# Patient Record
Sex: MALE | Race: WHITE | Employment: STUDENT | URBAN - METROPOLITAN AREA
[De-identification: names, ages, dates, MRNs, and addresses within clinical notes are randomized per-mention and may not be internally consistent; named-entity substitution may affect disease eponyms.]

---

## 2020-01-02 ENCOUNTER — OFFICE VISIT (OUTPATIENT)
Dept: FAMILY MEDICINE CLINIC | Age: 30
End: 2020-01-02

## 2020-01-02 VITALS
SYSTOLIC BLOOD PRESSURE: 131 MMHG | DIASTOLIC BLOOD PRESSURE: 85 MMHG | TEMPERATURE: 98 F | RESPIRATION RATE: 16 BRPM | HEIGHT: 74 IN | OXYGEN SATURATION: 100 % | HEART RATE: 92 BPM | BODY MASS INDEX: 23.61 KG/M2 | WEIGHT: 184 LBS

## 2020-01-02 DIAGNOSIS — Z13.220 SCREENING, LIPID: ICD-10-CM

## 2020-01-02 DIAGNOSIS — Z12.83 SCREENING FOR MALIGNANT NEOPLASM OF SKIN: ICD-10-CM

## 2020-01-02 DIAGNOSIS — Z00.00 ROUTINE GENERAL MEDICAL EXAMINATION AT A HEALTH CARE FACILITY: Primary | ICD-10-CM

## 2020-01-02 DIAGNOSIS — Z23 ENCOUNTER FOR IMMUNIZATION: ICD-10-CM

## 2020-01-02 DIAGNOSIS — S39.011A STRAIN OF RECTUS ABDOMINIS MUSCLE, INITIAL ENCOUNTER: ICD-10-CM

## 2020-01-02 NOTE — PROGRESS NOTES
Assessment/Plan:     Diagnoses and all orders for this visit:    1. Routine general medical examination at a health care facility  -     CBC WITH AUTOMATED DIFF  -     METABOLIC PANEL, COMPREHENSIVE  Labs pending. He is otherwise up-to-date on routine care. 2. Encounter for immunization  -     INFLUENZA VIRUS VAC QUAD,SPLIT,PRESV FREE SYRINGE IM  -     WI IMMUNIZ ADMIN,1 SINGLE/COMB VAC/TOXOID    3. Screening, lipid  -     LIPID PANEL    4. Screening for malignant neoplasm of skin  -     REFERRAL TO DERMATOLOGY    5. Strain of rectus abdominis muscle, initial encounter  -     REFERRAL TO PHYSICAL THERAPY               Follow-up and Dispositions    · Return in about 1 year (around 1/2/2021) for Follow Up. Discussed expected course/resolution/complications of diagnosis in detail with patient.    Medication risks/benefits/costs/interactions/alternatives discussed with patient.    Pt was given after visit summary which includes diagnoses, current medications & vitals. Pt expressed understanding with the diagnosis and plan    HPI:   Jessie Bell is a 34 y.o. male who presents for annual exam and to establish care. Reports a several month history of abdominal pain since lifting. History of right inguinal hernia repair 10 years ago. This is his first evaluation. Symptoms are worse with activity and improve with rest.  Has not attempted otc treatment. He was previously active. He reports a healthy diet. He is a missionary. He plans to relocate to [facility]. Allergies   Allergen Reactions    Adhesive Tape-Silicones Hives      There is no problem list on file for this patient. Past Medical History:   Diagnosis Date    Back pain, chronic       Past Surgical History:   Procedure Laterality Date    HX HERNIA REPAIR  2012    HX OTHER SURGICAL      left forearm fracture      No LMP for male patient.    Family History   Problem Relation Age of Onset    Bipolar Disorder Mother     No Known Problems Father     Bipolar Disorder Sister     Bipolar Disorder Maternal Grandmother       Social History     Socioeconomic History    Marital status: UNKNOWN     Spouse name: Not on file    Number of children: Not on file    Years of education: Not on file    Highest education level: Not on file   Occupational History    Not on file   Social Needs    Financial resource strain: Not on file    Food insecurity:     Worry: Not on file     Inability: Not on file    Transportation needs:     Medical: Not on file     Non-medical: Not on file   Tobacco Use    Smoking status: Never Smoker    Smokeless tobacco: Never Used   Substance and Sexual Activity    Alcohol use: Yes     Comment: one weekly.  Drug use: Never    Sexual activity: Not on file   Lifestyle    Physical activity:     Days per week: Not on file     Minutes per session: Not on file    Stress: Not on file   Relationships    Social connections:     Talks on phone: Not on file     Gets together: Not on file     Attends Restorationist service: Not on file     Active member of club or organization: Not on file     Attends meetings of clubs or organizations: Not on file     Relationship status: Not on file    Intimate partner violence:     Fear of current or ex partner: Not on file     Emotionally abused: Not on file     Physically abused: Not on file     Forced sexual activity: Not on file   Other Topics Concern    Not on file   Social History Narrative    Not on file        ROS:     Review of Systems   Constitutional: Negative for fever, malaise/fatigue and weight loss. HENT: Negative for hearing loss. Eyes: Negative for blurred vision and pain. Respiratory: Negative for cough and shortness of breath. Cardiovascular: Negative for chest pain, palpitations and leg swelling. Gastrointestinal: Positive for abdominal pain. Negative for blood in stool, constipation, diarrhea and melena.    Genitourinary: Negative for dysuria and hematuria. Musculoskeletal: Negative for joint pain. Skin: Negative for rash. Neurological: Negative for headaches. Psychiatric/Behavioral: Negative for depression. The patient is not nervous/anxious and does not have insomnia. Physical Exam:     Visit Vitals  /85   Pulse 92   Temp 98 °F (36.7 °C) (Oral)   Resp 16   Ht 6' 2\" (1.88 m)   Wt 184 lb (83.5 kg)   SpO2 100%   BMI 23.62 kg/m²        Vital signs and nurse documentation reviewed. Physical Exam  Constitutional:       General: He is not in acute distress. HENT:      Right Ear: No drainage. No middle ear effusion. Tympanic membrane is not injected, erythematous or bulging. Left Ear: No drainage. No middle ear effusion. Tympanic membrane is not injected, erythematous or bulging. Nose: No mucosal edema or rhinorrhea. Mouth/Throat:      Dentition: Normal dentition. Pharynx: No oropharyngeal exudate or posterior oropharyngeal erythema. Tonsils: No tonsillar abscesses. Eyes:      Pupils: Pupils are equal, round, and reactive to light. Neck:      Thyroid: No thyroid mass or thyromegaly. Vascular: No carotid bruit or JVD. Trachea: No tracheal deviation. Cardiovascular:      Pulses:           Dorsalis pedis pulses are 2+ on the right side and 2+ on the left side. Posterior tibial pulses are 2+ on the right side and 2+ on the left side. Heart sounds: S1 normal and S2 normal. No murmur. No friction rub. No gallop. Pulmonary:      Breath sounds: Normal breath sounds. No wheezing. Abdominal:      General: Bowel sounds are normal. There is no distension. Palpations: Abdomen is soft. There is no mass. Tenderness: There is no tenderness. Lymphadenopathy:      Cervical: No cervical adenopathy. Skin:     General: Skin is warm and dry. Neurological:      Cranial Nerves: No cranial nerve deficit. Sensory: Sensation is intact. Motor: Motor function is intact.       Gait: Gait is intact.    Psychiatric:         Mood and Affect: Mood and affect normal.         Cognition and Memory: Memory normal.         Judgment: Judgment normal.

## 2020-01-02 NOTE — PROGRESS NOTES
Chief Complaint   Patient presents with   [facility]    Hernia (Non Specific)    Skin Exam     1. Have you been to the ER, urgent care clinic since your last visit? Hospitalized since your last visit? No    2. Have you seen or consulted any other health care providers outside of the [address] [place] [address] since your last visit? Include any pap smears or colon screening.  No

## 2020-01-03 LAB
ALBUMIN SERPL-MCNC: 4.9 G/DL (ref 3.5–5.5)
ALBUMIN/GLOB SERPL: 2.6 {RATIO} (ref 1.2–2.2)
ALP SERPL-CCNC: 63 IU/L (ref 39–117)
ALT SERPL-CCNC: 37 IU/L (ref 0–44)
AST SERPL-CCNC: 22 IU/L (ref 0–40)
BASOPHILS # BLD AUTO: 0 X10E3/UL (ref 0–0.2)
BASOPHILS NFR BLD AUTO: 1 %
BILIRUB SERPL-MCNC: 1 MG/DL (ref 0–1.2)
BUN SERPL-MCNC: 12 MG/DL (ref 6–20)
BUN/CREAT SERPL: 15 (ref 9–20)
CALCIUM SERPL-MCNC: 9.6 MG/DL (ref 8.7–10.2)
CHLORIDE SERPL-SCNC: 105 MMOL/L (ref 96–106)
CHOLEST SERPL-MCNC: 158 MG/DL (ref 100–199)
CO2 SERPL-SCNC: 22 MMOL/L (ref 20–29)
CREAT SERPL-MCNC: 0.8 MG/DL (ref 0.76–1.27)
EOSINOPHIL # BLD AUTO: 0.1 X10E3/UL (ref 0–0.4)
EOSINOPHIL NFR BLD AUTO: 2 %
ERYTHROCYTE [DISTWIDTH] IN BLOOD BY AUTOMATED COUNT: 12.3 % (ref 12.3–15.4)
GLOBULIN SER CALC-MCNC: 1.9 G/DL (ref 1.5–4.5)
GLUCOSE SERPL-MCNC: 90 MG/DL (ref 65–99)
HCT VFR BLD AUTO: 39.8 % (ref 37.5–51)
HDLC SERPL-MCNC: 55 MG/DL
HGB BLD-MCNC: 13.7 G/DL (ref 13–17.7)
IMM GRANULOCYTES # BLD AUTO: 0.1 X10E3/UL (ref 0–0.1)
IMM GRANULOCYTES NFR BLD AUTO: 1 %
INTERPRETATION, 910389: NORMAL
LDLC SERPL CALC-MCNC: 89 MG/DL (ref 0–99)
LYMPHOCYTES # BLD AUTO: 1.5 X10E3/UL (ref 0.7–3.1)
LYMPHOCYTES NFR BLD AUTO: 26 %
MCH RBC QN AUTO: 30.9 PG (ref 26.6–33)
MCHC RBC AUTO-ENTMCNC: 34.4 G/DL (ref 31.5–35.7)
MCV RBC AUTO: 90 FL (ref 79–97)
MONOCYTES # BLD AUTO: 0.6 X10E3/UL (ref 0.1–0.9)
MONOCYTES NFR BLD AUTO: 11 %
NEUTROPHILS # BLD AUTO: 3.5 X10E3/UL (ref 1.4–7)
NEUTROPHILS NFR BLD AUTO: 59 %
PLATELET # BLD AUTO: 241 X10E3/UL (ref 150–450)
POTASSIUM SERPL-SCNC: 4.6 MMOL/L (ref 3.5–5.2)
PROT SERPL-MCNC: 6.8 G/DL (ref 6–8.5)
RBC # BLD AUTO: 4.44 X10E6/UL (ref 4.14–5.8)
SODIUM SERPL-SCNC: 143 MMOL/L (ref 134–144)
TRIGL SERPL-MCNC: 68 MG/DL (ref 0–149)
VLDLC SERPL CALC-MCNC: 14 MG/DL (ref 5–40)
WBC # BLD AUTO: 5.8 X10E3/UL (ref 3.4–10.8)

## 2020-03-18 ENCOUNTER — TELEPHONE (OUTPATIENT)
Dept: FAMILY MEDICINE CLINIC | Age: 30
End: 2020-03-18

## 2020-03-18 NOTE — TELEPHONE ENCOUNTER
Outbound call to patient. Patient states that he lives in the home with someone who may have possibly been exposed to the corona virus. Patient states that he is having a sore throat, cough, and very fatigued. Advised patient that no testing is currently available in the office, but if thinks he may have been exposed or symptoms get worse can go to the emergency room. Patient verbalized understanding.

## 2020-03-18 NOTE — TELEPHONE ENCOUNTER
Pt c/o slight cough sore throat, difficult thnking. Denies fever and exposure    BCB#[phone]      Pt wants to advice as to where he should go to be tested.

## 2020-04-21 ENCOUNTER — VIRTUAL VISIT (OUTPATIENT)
Dept: FAMILY MEDICINE CLINIC | Age: 30
End: 2020-04-21

## 2020-04-21 VITALS — BODY MASS INDEX: 22.84 KG/M2 | HEIGHT: 74 IN | WEIGHT: 178 LBS

## 2020-04-21 DIAGNOSIS — R21 RASH: Primary | ICD-10-CM

## 2020-04-21 RX ORDER — PREDNISONE 20 MG/1
TABLET ORAL
Qty: 18 TAB | Refills: 0 | Status: SHIPPED | OUTPATIENT
Start: 2020-04-21 | End: 2020-05-06 | Stop reason: SDUPTHER

## 2020-04-21 RX ORDER — DOXYCYCLINE 100 MG/1
100 CAPSULE ORAL 2 TIMES DAILY
COMMUNITY
End: 2020-06-03

## 2020-04-21 NOTE — PROGRESS NOTES
Consent: Hector Fu, who was seen by synchronous (real-time) audio-video technology, and/or his healthcare decision maker, is aware that this patient-initiated, Telehealth encounter on 4/21/2020 is a billable service, with coverage as determined by his insurance carrier. He is aware that he may receive a bill and has provided verbal consent to proceed: yes. Assessment & Plan:   Diagnoses and all orders for this visit:    1. Rash  -     predniSONE (DELTASONE) 20 mg tablet; Take 1 tab tid x 3 days, then 1 tab bid x 3 days, then 1 tab daily x 3 days         -   Hold doxycycline     712  Subjective: Hector Fu is a 31 y.o. male who was seen for Rash (rash on both legs for a week raised and red no drainage does itch and painful when walking  He has been working out side in his yard      Prior to Admission medications    Medication Sig Start Date End Date Taking? Authorizing Provider   doxycycline (MONODOX) 100 mg capsule Take 100 mg by mouth two (2) times a day. Yes Provider, Historical   predniSONE (DELTASONE) 20 mg tablet Take 1 tab tid x 3 days, then 1 tab bid x 3 days, then 1 tab daily x 3 days 4/21/20  Yes Pamela Ledezma NP     Allergies   Allergen Reactions    Adhesive Tape-Silicones Hives       There are no active problems to display for this patient. Current Outpatient Medications   Medication Sig Dispense Refill    doxycycline (MONODOX) 100 mg capsule Take 100 mg by mouth two (2) times a day.       predniSONE (DELTASONE) 20 mg tablet Take 1 tab tid x 3 days, then 1 tab bid x 3 days, then 1 tab daily x 3 days 18 Tab 0     Allergies   Allergen Reactions    Adhesive Tape-Silicones Hives     Past Medical History:   Diagnosis Date    Back pain, chronic      Past Surgical History:   Procedure Laterality Date    HX HERNIA REPAIR  2012    HX OTHER SURGICAL      left forearm fracture     Family History   Problem Relation Age of Onset    Bipolar Disorder Mother     No Known Problems Father  Bipolar Disorder Sister     Bipolar Disorder Maternal Grandmother      Social History     Tobacco Use    Smoking status: Never Smoker    Smokeless tobacco: Never Used   Substance Use Topics    Alcohol use: Yes     Comment: one weekly. Review of Systems   Constitutional: Negative. Respiratory: Negative. Cardiovascular: Negative. Skin: Positive for itching and rash.            Objective:   Vital Signs: (As obtained by patient/caregiver at home)  Visit Vitals  Ht 6' 2\" (1.88 m)   Wt 178 lb (80.7 kg)   BMI 22.85 kg/m²              Constitutional: [x] Appears well-developed and well-nourished [x] No apparent distress      [] Abnormal -     Mental status: [x] Alert and awake  [x] Oriented to person/place/time [x] Able to follow commands    [] Abnormal -     Eyes:   EOM    [x]  Normal    [] Abnormal -   Sclera  [x]  Normal    [] Abnormal -          Discharge [x]  None visible   [] Abnormal -     HENT: [x] Normocephalic, atraumatic  [] Abnormal -   [x] Mouth/Throat: Mucous membranes are moist    External Ears [x] Normal  [] Abnormal -    Neck: [x] No visualized mass [] Abnormal -     Pulmonary/Chest: [x] Respiratory effort normal   [x] No visualized signs of difficulty breathing or respiratory distress        [] Abnormal -      Musculoskeletal:   [x] Normal gait with no signs of ataxia         [x] Normal range of motion of neck        [] Abnormal -     Neurological:        [x] No Facial Asymmetry (Cranial nerve 7 motor function) (limited exam due to video visit)          [x] No gaze palsy        [] Abnormal -          Skin:        [] No significant exanthematous lesions or discoloration noted on facial skin         [x] Abnormal - erythematous confluent rash on both thighs and lower legs           Psychiatric:       [x] Normal Affect [] Abnormal -        [x] No Hallucinations    Other pertinent observable physical exam findings:-        We discussed the expected course, resolution and complications of the diagnosis(es) in detail. Medication risks, benefits, costs, interactions, and alternatives were discussed as indicated. I advised him to contact the office if his condition worsens, changes or fails to improve as anticipated. He expressed understanding with the diagnosis(es) and plan. Benjamín Rogers is a 31 y.o. male being evaluated by a video visit encounter for concerns as above. A caregiver was present when appropriate. Due to this being a TeleHealth encounter (During NADLG-44 public health emergency), evaluation of the following organ systems was limited: Vitals/Constitutional/EENT/Resp/CV/GI//MS/Neuro/Skin/Heme-Lymph-Imm. Pursuant to the emergency declaration under the [facility] waiver authority and the [employer] and [employer] Act, this Virtual  Visit was conducted, with patient's (and/or legal guardian's) consent, to reduce the patient's risk of exposure to COVID-19 and provide necessary medical care. Services were provided through a video synchronous discussion virtually to substitute for in-person clinic visit. Patient and provider were located at their individual homes.     Will call patient Kaur Joe [address], NP

## 2020-04-24 ENCOUNTER — VIRTUAL VISIT (OUTPATIENT)
Dept: FAMILY MEDICINE CLINIC | Age: 30
End: 2020-04-24

## 2020-04-24 DIAGNOSIS — R21 RASH: Primary | ICD-10-CM

## 2020-04-24 NOTE — PROGRESS NOTES
Burt Sanders  31 y.o. male  1990  [address]  [id number]     [address] [facility]       Encounter Date: 4/24/2020           Established Patient Visit Note: Burgess Naeem NP    Reason for Appointment:  Chief Complaint   Patient presents with    Rash     is improving since taking prednisone, stopped soxycycline given in other clinic       History of Present Illness:  History provided by patient reports that rash is improving since taking prednisone. Stopped doxycyline     Burt Sanders is a 31 y.o. male who presents today for:        Review of Systems  Review of Systems   Constitutional: Negative. Respiratory: Negative. Cardiovascular: Negative. Gastrointestinal: Negative. Skin: Positive for itching and rash. Allergies: Adhesive tape-silicones    Medications: (Updated to reflect final medication list after visit)    Current Outpatient Medications:     doxycycline (MONODOX) 100 mg capsule, Take 100 mg by mouth two (2) times a day., Disp: , Rfl:     predniSONE (DELTASONE) 20 mg tablet, Take 1 tab tid x 3 days, then 1 tab bid x 3 days, then 1 tab daily x 3 days, Disp: 18 Tab, Rfl: 0    History  Patient Care Team:  Catia Jaramillo NP as PCP - General (Nurse Practitioner)  Catia Jaramillo NP as PCP - [facility]    Past Medical History: he has a past medical history of Back pain, chronic. Past Surgical History: he has a past surgical history that includes hx hernia repair (2012) and hx other surgical.    Family Medical History: family history includes Bipolar Disorder in his maternal grandmother, mother, and sister; No Known Problems in his father. Social History: he reports that he has never smoked. He has never used smokeless tobacco. He reports current alcohol use. He reports that he does not use drugs. No specialty comments available.       Objective:   Vital Signs  Unable to obtain vital signs today as patient does not have equipment for this at home    Physical Exam  Constitutional:       Appearance: Normal appearance. HENT:      Mouth/Throat:      Mouth: Mucous membranes are moist.   Neck:      Musculoskeletal: Normal range of motion and neck supple. Skin:     Comments: Rash is improving    Neurological:      Mental Status: He is alert. Assessment & Plan:        I was in the office while conducting this encounter. Consent:  He and/or his healthcare decision maker is aware that this patient-initiated Telehealth encounter is a billable service, with coverage as determined by his insurance carrier. He is aware that he may receive a bill and has provided verbal consent to proceed: Yes    This virtual visit was conducted via [employer]. Pursuant to the emergency declaration under the [facility] waiver authority and the [employer] and [employer] Act, this Virtual  Visit was conducted to reduce the patient's risk of exposure to COVID-19 and provide continuity of care for an established patient. Services were provided through a video synchronous discussion virtually to substitute for in-person clinic visit. Due to this being a TeleHealth evaluation, many elements of the physical examination are unable to be assessed. Total Time: minutes: 5-10 minutes. I have discussed the diagnosis with the patient and the intended plan as seen in the above orders. The patient has received an after-visit summary along with patient information handout. I have discussed medication side effects and warnings with the patient as well.     Disposition        Trudy Gaines NP

## 2020-06-03 ENCOUNTER — VIRTUAL VISIT (OUTPATIENT)
Dept: FAMILY MEDICINE CLINIC | Age: 30
End: 2020-06-03

## 2020-06-03 DIAGNOSIS — L24.7 IRRITANT CONTACT DERMATITIS DUE TO PLANTS, EXCEPT FOOD: ICD-10-CM

## 2020-06-03 DIAGNOSIS — R21 BLISTERING RASH: Primary | ICD-10-CM

## 2020-06-03 RX ORDER — TRIAMCINOLONE ACETONIDE 5 MG/G
CREAM TOPICAL
Qty: 454 G | Refills: 0 | Status: SHIPPED | OUTPATIENT
Start: 2020-06-03 | End: 2020-11-11

## 2020-06-03 NOTE — PATIENT INSTRUCTIONS
Rash: Care Instructions Your Care Instructions A rash is any irritation or inflammation of the skin. Rashes have many possible causes, including allergy, infection, illness, heat, and emotional stress. Follow-up care is a key part of your treatment and safety. Be sure to make and go to all appointments, and call your doctor if you are having problems. It's also a good idea to know your test results and keep a list of the medicines you take. How can you care for yourself at home? · Wash the area with water only. Soap can make dryness and itching worse. Pat dry. · Put cold, wet cloths on the rash to reduce itching. · Keep cool, and stay out of the sun. · Leave the rash open to the air as much of the time as possible. · Sometimes petroleum jelly (Vaseline) can help relieve the discomfort caused by a rash. A moisturizing lotion, such as Cetaphil, also may help. Calamine lotion may help for rashes caused by contact with something (such as a plant or soap) that irritated the skin. Use it 3 or 4 times a day. · If your doctor prescribed a cream, use it as directed. If your doctor prescribed medicine, take it exactly as directed. · If your rash itches so badly that it interferes with your normal activities, take an over-the-counter antihistamine, such as diphenhydramine (Benadryl) or loratadine (Claritin). Read and follow all instructions on the label. When should you call for help? Call your doctor now or seek immediate medical care if: 
· You have signs of infection, such as: 
? Increased pain, swelling, warmth, or redness. ? Red streaks leading from the area. ? Pus draining from the area. ? A fever. · You have joint pain along with the rash. Watch closely for changes in your health, and be sure to contact your doctor if: 
· Your rash is changing or getting worse. For example, call if you have pain along with the rash, the rash is spreading, or you have new blisters. · You do not get better after 1 week. Where can you learn more? Go to http://sotero-amos.info/ Enter Z867 in the search box to learn more about \"Rash: Care Instructions. \" Current as of: October 31, 2019               Content Version: 12.5 © 2870-6062 [employer]. Care instructions adapted under license by [employer] (which disclaims liability or warranty for this information). If you have questions about a medical condition or this instruction, always ask your healthcare professional. [address] any warranty or liability for your use of this information.

## 2020-06-03 NOTE — PROGRESS NOTES
[id number] [address] Note      Subjective:     Chief Complaint   Patient presents with   Caio Weber is a 31y.o. year old male who presents for virtual evaluation of the following:    Rash: Onset: 1 week ago after pulling weeds  Character: blisters, itchy, stable for 2 days  Location: hands, left elbow, torso  Treatment: calamine and cortisone cream  Denies chest pain, shortness of breath, fever, vomiting, diarrhea, constipation  No sick contacts          Review of Systems   Pertinent positives and negative per HPI. All other systems  reviewed are negative for a Comprehensive ROS (10+). Past Medical History:   Diagnosis Date    Back pain, chronic         Social History     Socioeconomic History    Marital status: UNKNOWN     Spouse name: Not on file    Number of children: Not on file    Years of education: Not on file    Highest education level: Not on file   Occupational History    Not on file   Social Needs    Financial resource strain: Not on file    Food insecurity     Worry: Not on file     Inability: Not on file    Transportation needs     Medical: Not on file     Non-medical: Not on file   Tobacco Use    Smoking status: Never Smoker    Smokeless tobacco: Never Used   Substance and Sexual Activity    Alcohol use: Yes     Comment: one weekly.      Drug use: Never    Sexual activity: Never   Lifestyle    Physical activity     Days per week: Not on file     Minutes per session: Not on file    Stress: Not on file   Relationships    Social connections     Talks on phone: Not on file     Gets together: Not on file     Attends Hinduism service: Not on file     Active member of club or organization: Not on file     Attends meetings of clubs or organizations: Not on file     Relationship status: Not on file    Intimate partner violence     Fear of current or ex partner: Not on file     Emotionally abused: Not on file     Physically abused: Not on file     Forced sexual activity: Not on file   Other Topics Concern    Not on file   Social History Narrative    Not on file       Family History   Problem Relation Age of Onset    Bipolar Disorder Mother     No Known Problems Father     Bipolar Disorder Sister     Bipolar Disorder Maternal Grandmother        Current Outpatient Medications   Medication Sig    predniSONE (DELTASONE) 20 mg tablet Take 1 tab tid x 3 days, then 1 tab bid x 3 days, then 1 tab daily x 3 days    doxycycline (MONODOX) 100 mg capsule Take 100 mg by mouth two (2) times a day. No current facility-administered medications for this visit. Objective: There were no vitals filed for this visit. Physical Examination:  General: Alert, cooperative, no distress, appears stated age. Eyes: Conjunctivae clear. Pupils equally round. Extraocular muscles intact. Ears: Normal appearing external ear   Nose: Nares normal appearing  Mouth/Throat: Lips, mucosa, and tongue normal. Moist mucous membranes. No tonsillar enlargement noted. Neck: Supple, symmetrical, trachea midline, no neck mass visualized. Respiratory: Breathing comfortably, in no acute respiratory distress. Cardiovascular: Visualized extremities without edema. MSK: Upper extremities normal appearing. Skin: blisters of hands  Neurologic: No facial asymmetry. Normal gaze. Cranial nerves intact. Psychiatric: Affect appropriate. Mood euthymic. Thoughts logical. Speech volume and speed normal. No hallucinations. Well kempt. No visits with results within 3 Month(s) from this visit.    Latest known visit with results is:   Office Visit on 01/02/2020   Component Date Value Ref Range Status    WBC 01/02/2020 5.8  3.4 - 10.8 x10E3/uL Final    RBC 01/02/2020 4.44  4.14 - 5.80 x10E6/uL Final    HGB 01/02/2020 13.7  13.0 - 17.7 g/dL Final    HCT 01/02/2020 39.8  37.5 - 51.0 % Final    MCV 01/02/2020 90  79 - 97 fL Final    MCH 01/02/2020 30.9  26.6 - 33.0 pg Final    MCHC 01/02/2020 34.4  31.5 - 35.7 g/dL Final    RDW 01/02/2020 12.3  12.3 - 15.4 % Final    Comment: **Effective January 6, 2020, the RDW pediatric reference**    interval will be removed and the adult reference interval    will be changing to:                             Female 11.7 - 15.4                                                       Male 11.6 - 15.4      PLATELET 08/29/3356 195  150 - 450 x10E3/uL Final    NEUTROPHILS 01/02/2020 59  Not Estab. % Final    Lymphocytes 01/02/2020 26  Not Estab. % Final    MONOCYTES 01/02/2020 11  Not Estab. % Final    EOSINOPHILS 01/02/2020 2  Not Estab. % Final    BASOPHILS 01/02/2020 1  Not Estab. % Final    ABS. NEUTROPHILS 01/02/2020 3.5  1.4 - 7.0 x10E3/uL Final    Abs Lymphocytes 01/02/2020 1.5  0.7 - 3.1 x10E3/uL Final    ABS. MONOCYTES 01/02/2020 0.6  0.1 - 0.9 x10E3/uL Final    ABS. EOSINOPHILS 01/02/2020 0.1  0.0 - 0.4 x10E3/uL Final    ABS. BASOPHILS 01/02/2020 0.0  0.0 - 0.2 x10E3/uL Final    IMMATURE GRANULOCYTES 01/02/2020 1  Not Estab. % Final    ABS. IMM. GRANS. 01/02/2020 0.1  0.0 - 0.1 x10E3/uL Final    Glucose 01/02/2020 90  65 - 99 mg/dL Final    BUN 01/02/2020 12  6 - 20 mg/dL Final    Creatinine 01/02/2020 0.80  0.76 - 1.27 mg/dL Final    GFR est non-AA 01/02/2020 122  >59 mL/min/1.73 Final    GFR est AA 01/02/2020 142  >59 mL/min/1.73 Final    BUN/Creatinine ratio 01/02/2020 15  9 - 20 Final    Sodium 01/02/2020 143  134 - 144 mmol/L Final    Potassium 01/02/2020 4.6  3.5 - 5.2 mmol/L Final    Chloride 01/02/2020 105  96 - 106 mmol/L Final    CO2 01/02/2020 22  20 - 29 mmol/L Final    Calcium 01/02/2020 9.6  8.7 - 10.2 mg/dL Final    Protein, total 01/02/2020 6.8  6.0 - 8.5 g/dL Final    Albumin 01/02/2020 4.9  3.5 - 5.5 g/dL Final    GLOBULIN, TOTAL 01/02/2020 1.9  1.5 - 4.5 g/dL Final    A-G Ratio 01/02/2020 2.6* 1.2 - 2.2 Final    Bilirubin, total 01/02/2020 1.0  0.0 - 1.2 mg/dL Final    Alk. phosphatase 01/02/2020 63  39 - 117 IU/L Final    AST (SGOT) 01/02/2020 22  0 - 40 IU/L Final    ALT (SGPT) 01/02/2020 37  0 - 44 IU/L Final    Cholesterol, total 01/02/2020 158  100 - 199 mg/dL Final    Triglyceride 01/02/2020 68  0 - 149 mg/dL Final    HDL Cholesterol 01/02/2020 55  >39 mg/dL Final    VLDL, calculated 01/02/2020 14  5 - 40 mg/dL Final    LDL, calculated 01/02/2020 89  0 - 99 mg/dL Final    INTERPRETATION 01/02/2020 Note   Final    Supplemental report is available. Assessment/ Plan:   Diagnoses and all orders for this visit:    1. Blistering rash  -     triamcinolone (ARISTOCORT) 0.5 % topical cream; Apply  to affected area two (2) times daily as needed for Skin Irritation or Itching. use thin layer    2. Irritant contact dermatitis due to plants, except food  -     triamcinolone (ARISTOCORT) 0.5 % topical cream; Apply  to affected area two (2) times daily as needed for Skin Irritation or Itching. use thin layer        Topical steroid for contact dermatitis. We discussed the expected course, resolution and complications of the diagnosis(es) in detail. Medication risks, benefits, costs, interactions, and alternatives were discussed as indicated. I advised him to contact the office if his condition worsens, changes or fails to improve as anticipated. He expressed understanding with the diagnosis(es) and plan. Shayy Pineda is a 31 y.o. male being evaluated by a video visit encounter for concerns as above. A caregiver was present when appropriate. Due to this being a TeleHealth encounter ([facility]), evaluation of the following organ systems was limited: Vitals/Constitutional/EENT/Resp/CV/GI//MS/Neuro/Skin/Heme-Lymph-Imm.   Pursuant to the emergency declaration under the [address] [place] [address], [address] [place] [address] and the [employer] General Act, this Virtual  Visit was conducted, with patient's (and/or legal guardian's) consent, to reduce the patient's risk of exposure to COVID-19 and provide necessary medical care. Services were provided through a video synchronous discussion virtually to substitute for in-person clinic visit. Provider was in the office while conducting this encounter. Patient was at home during encounter. Other persons participating in call: None  Consent:  He and/or his healthcare decision maker is aware that this patient-initiated Telehealth encounter is a billable service, with coverage as determined by his insurance carrier. He is aware that he may receive a bill and has provided verbal consent to proceed: Yes  This virtual visit was conducted via [address] Pursuant to the emergency declaration under the [facility], [facility] waiver authority and the [employer] and [employer] Act, this Virtual  Visit was conducted to reduce the patient's risk of exposure to COVID-19 and provide continuity of care for an established patient. Services were provided through a video synchronous discussion virtually to substitute for in-person clinic visit. Due to this being a TeleHealth evaluation, many elements of the physical examination are unable to be assessed. Total Time: minutes: 11-20 minutes. Educated patient on red flag symptoms to warrant return to clinic or emergency room visit. I have discussed the diagnosis with the patient and the intended plan as seen in the above orders. The patient has been offered or received an after-visit summary and questions were answered concerning future plans. I have discussed medication side effects and warnings with the patient as well. Follow-up and Dispositions    · Return if symptoms worsen or fail to improve.          Signed,    Sera Smith MD  6/3/2020

## 2020-07-17 DIAGNOSIS — Z11.1 TUBERCULOSIS SCREENING: Primary | ICD-10-CM

## 2020-07-28 ENCOUNTER — VIRTUAL VISIT (OUTPATIENT)
Dept: FAMILY MEDICINE CLINIC | Age: 30
End: 2020-07-28

## 2020-07-28 DIAGNOSIS — Z11.1 TUBERCULOSIS SCREENING: Primary | ICD-10-CM

## 2020-07-28 NOTE — PROGRESS NOTES
[id number] [address] Note      Subjective:     Chief Complaint   Patient presents with    Follow-up     Pt requesting a tb test for work      Jolynn Shrestha is a 31y.o. year old male who presents for virtual evaluation of the following:     Tuberculosis:  TB test for work  No form to be signed  Denies known TB exposure, night sweats, fever, chest pain, shortness of breath, work with at risk population. Review of Systems   Pertinent positives and negative per HPI. All other systems  reviewed are negative for a Comprehensive ROS (10+). Past Medical History:   Diagnosis Date    Back pain, chronic         Social History     Socioeconomic History    Marital status: UNKNOWN     Spouse name: Not on file    Number of children: Not on file    Years of education: Not on file    Highest education level: Not on file   Occupational History    Not on file   Social Needs    Financial resource strain: Not on file    Food insecurity     Worry: Not on file     Inability: Not on file    Transportation needs     Medical: Not on file     Non-medical: Not on file   Tobacco Use    Smoking status: Never Smoker    Smokeless tobacco: Never Used   Substance and Sexual Activity    Alcohol use:  Yes     Alcohol/week: 5.0 standard drinks     Types: 5 Cans of beer per week    Drug use: Never    Sexual activity: Never   Lifestyle    Physical activity     Days per week: Not on file     Minutes per session: Not on file    Stress: Not on file   Relationships    Social connections     Talks on phone: Not on file     Gets together: Not on file     Attends [employer] service: Not on file     Active member of club or organization: Not on file     Attends meetings of clubs or organizations: Not on file     Relationship status: Not on file    Intimate partner violence     Fear of current or ex partner: Not on file     Emotionally abused: Not on file     Physically abused: Not on file     Forced sexual activity: Not on file   Other Topics Concern    Not on file   Social History Narrative    Not on file       Family History   Problem Relation Age of Onset    Bipolar Disorder Mother     No Known Problems Father     Bipolar Disorder Sister     Bipolar Disorder Maternal Grandmother        Current Outpatient Medications   Medication Sig    triamcinolone (ARISTOCORT) 0.5 % topical cream Apply  to affected area two (2) times daily as needed for Skin Irritation or Itching. use thin layer (Patient not taking: Reported on 7/28/2020)     No current facility-administered medications for this visit. Objective: There were no vitals filed for this visit. Physical Examination:  General: Alert, cooperative, no distress, appears stated age. Eyes: Conjunctivae clear. Pupils equally round. Extraocular muscles intact. Ears: Normal appearing external ear   Nose: Nares normal appearing  Mouth/Throat: Lips, mucosa, and tongue normal. Moist mucous membranes. No tonsillar enlargement noted. Neck: Supple, symmetrical, trachea midline, no neck mass visualized. Respiratory: Breathing comfortably, in no acute respiratory distress. Cardiovascular: Visualized extremities without edema. MSK: Upper extremities normal appearing. Skin: No significant erythematous lesions or discoloration noted on facial skin. No rashes or lesions on exposed skin. Neurologic: No facial asymmetry. Normal gaze. Cranial nerves intact. Psychiatric: Affect appropriate. Mood euthymic. Thoughts logical. Speech volume and speed normal. No hallucinations. Well kempt. No visits with results within 3 Month(s) from this visit.    Latest known visit with results is:   Office Visit on 01/02/2020   Component Date Value Ref Range Status    WBC 01/02/2020 5.8  3.4 - 10.8 x10E3/uL Final    RBC 01/02/2020 4.44  4.14 - 5.80 x10E6/uL Final    HGB 01/02/2020 13.7  13.0 - 17.7 g/dL Final    HCT 01/02/2020 39.8  37.5 - 51.0 % Final    MCV 01/02/2020 90  79 - 97 fL Final    MCH 01/02/2020 30.9  26.6 - 33.0 pg Final    MCHC 01/02/2020 34.4  31.5 - 35.7 g/dL Final    RDW 01/02/2020 12.3  12.3 - 15.4 % Final    Comment: **Effective January 6, 2020, the RDW pediatric reference**    interval will be removed and the adult reference interval    will be changing to:                             Female 11.7 - 15.4                                                       Male 11.6 - 15.4      PLATELET 35/45/5974 410  150 - 450 x10E3/uL Final    NEUTROPHILS 01/02/2020 59  Not Estab. % Final    Lymphocytes 01/02/2020 26  Not Estab. % Final    MONOCYTES 01/02/2020 11  Not Estab. % Final    EOSINOPHILS 01/02/2020 2  Not Estab. % Final    BASOPHILS 01/02/2020 1  Not Estab. % Final    ABS. NEUTROPHILS 01/02/2020 3.5  1.4 - 7.0 x10E3/uL Final    Abs Lymphocytes 01/02/2020 1.5  0.7 - 3.1 x10E3/uL Final    ABS. MONOCYTES 01/02/2020 0.6  0.1 - 0.9 x10E3/uL Final    ABS. EOSINOPHILS 01/02/2020 0.1  0.0 - 0.4 x10E3/uL Final    ABS. BASOPHILS 01/02/2020 0.0  0.0 - 0.2 x10E3/uL Final    IMMATURE GRANULOCYTES 01/02/2020 1  Not Estab. % Final    ABS. IMM.  GRANS. 01/02/2020 0.1  0.0 - 0.1 x10E3/uL Final    Glucose 01/02/2020 90  65 - 99 mg/dL Final    BUN 01/02/2020 12  6 - 20 mg/dL Final    Creatinine 01/02/2020 0.80  0.76 - 1.27 mg/dL Final    GFR est non-AA 01/02/2020 122  >59 mL/min/1.73 Final    GFR est AA 01/02/2020 142  >59 mL/min/1.73 Final    BUN/Creatinine ratio 01/02/2020 15  9 - 20 Final    Sodium 01/02/2020 143  134 - 144 mmol/L Final    Potassium 01/02/2020 4.6  3.5 - 5.2 mmol/L Final    Chloride 01/02/2020 105  96 - 106 mmol/L Final    CO2 01/02/2020 22  20 - 29 mmol/L Final    Calcium 01/02/2020 9.6  8.7 - 10.2 mg/dL Final    Protein, total 01/02/2020 6.8  6.0 - 8.5 g/dL Final    Albumin 01/02/2020 4.9  3.5 - 5.5 g/dL Final    GLOBULIN, TOTAL 01/02/2020 1.9  1.5 - 4.5 g/dL Final    A-G Ratio 01/02/2020 2.6* 1.2 - 2.2 Final    Bilirubin, total 01/02/2020 1.0  0.0 - 1.2 mg/dL Final    Alk. phosphatase 01/02/2020 63  39 - 117 IU/L Final    AST (SGOT) 01/02/2020 22  0 - 40 IU/L Final    ALT (SGPT) 01/02/2020 37  0 - 44 IU/L Final    Cholesterol, total 01/02/2020 158  100 - 199 mg/dL Final    Triglyceride 01/02/2020 68  0 - 149 mg/dL Final    HDL Cholesterol 01/02/2020 55  >39 mg/dL Final    VLDL, calculated 01/02/2020 14  5 - 40 mg/dL Final    LDL, calculated 01/02/2020 89  0 - 99 mg/dL Final    INTERPRETATION 01/02/2020 Note   Final    Supplemental report is available. Assessment/ Plan:   Diagnoses and all orders for this visit:    1. Tuberculosis screening  -     QUANTIFERON-TB GOLD PLUS      Requested tuberculosis screening ordered. Patient to schedule a lab visit. We discussed the expected course, resolution and complications of the diagnosis(es) in detail. Medication risks, benefits, costs, interactions, and alternatives were discussed as indicated. I advised him to contact the office if his condition worsens, changes or fails to improve as anticipated. He expressed understanding with the diagnosis(es) and plan. Modesta Maynard is a 31 y.o. male being evaluated by a video visit encounter for concerns as above. A caregiver was present when appropriate. Due to this being a TeleHealth encounter (During [facility]TG-69 public health emergency), evaluation of the following organ systems was limited: Vitals/Constitutional/EENT/Resp/CV/GI//MS/Neuro/Skin/Heme-Lymph-Imm. Pursuant to the emergency declaration under the [facility] waiver authority and the [employer] Act, this Virtual  Visit was conducted, with patient's (and/or legal guardian's) consent, to reduce the patient's risk of exposure to COVID-19 and provide necessary medical care.   Services were provided through a video synchronous discussion virtually to substitute for in-person clinic visit. Provider was in the office while conducting this encounter. Patient was at home during encounter. Other persons participating in call: None  Consent:  He and/or his healthcare decision maker is aware that this patient-initiated Telehealth encounter is a billable service, with coverage as determined by his insurance carrier. He is aware that he may receive a bill and has provided verbal consent to proceed: Yes  This virtual visit was conducted via [address] Pursuant to the emergency declaration under the [facility], [facility] waiver authority and the [employer] Act, this Virtual  Visit was conducted to reduce the patient's risk of exposure to COVID-19 and provide continuity of care for an established patient. Services were provided through a video synchronous discussion virtually to substitute for in-person clinic visit. Due to this being a TeleHealth evaluation, many elements of the physical examination are unable to be assessed. Total Time: minutes: 11-20 minutes. Educated patient on red flag symptoms to warrant return to clinic or emergency room visit. I have discussed the diagnosis with the patient and the intended plan as seen in the above orders. The patient has been offered or received an after-visit summary and questions were answered concerning future plans. I have discussed medication side effects and warnings with the patient as well. Follow-up and Dispositions    · Return if symptoms worsen or fail to improve.        Signed,    Caron Acevedo MD  7/28/2020

## 2020-07-28 NOTE — PROGRESS NOTES
Room: VV     Identified pt with two pt identifiers(name and ). Reviewed record in preparation for visit and have obtained necessary documentation. All patient medications has been reviewed. Chief Complaint   Patient presents with    Follow-up     Pt requesting a tb test for work        Health Maintenance Due   Topic    DTaP/Tdap/Td series (1 - Tdap)       There were no vitals filed for this visit. 1.Have you traveled outside of the [address] [place] [address] in the last month No    2. Have you been in close contact with someone who is suspected to have COVID-19 or has tested positive. No    3. Do you have any signs or symptoms? No    4. Have you been to the ER, urgent care clinic since your last visit? Hospitalized since your last visit? No    5. Have you seen or consulted any other health care providers outside of the [address] [place] [address] since your last visit? Include any pap smears or colon screening. No    Patient is accompanied by self I have received verbal consent from Brenda Ruvalcaba to discuss any/all medical information while they are present in the room.

## 2020-07-31 LAB
GAMMA INTERFERON BACKGROUND BLD IA-ACNC: 0.01 IU/ML
M TB IFN-G BLD-IMP: NEGATIVE
M TB IFN-G CD4+ BCKGRND COR BLD-ACNC: 0.01 IU/ML
MITOGEN IGNF BLD-ACNC: >10 IU/ML
QUANTIFERON INCUBATION, QF1T: NORMAL
QUANTIFERON TB2 AG: 0.01 IU/ML
SERVICE CMNT-IMP: NORMAL

## 2020-08-08 ENCOUNTER — HOSPITAL ENCOUNTER (EMERGENCY)
Age: 30
Discharge: HOME | End: 2020-08-08
Attending: STUDENT IN AN ORGANIZED HEALTH CARE EDUCATION/TRAINING PROGRAM
Payer: COMMERCIAL

## 2020-08-08 VITALS
BODY MASS INDEX: 23.65 KG/M2 | SYSTOLIC BLOOD PRESSURE: 126 MMHG | HEART RATE: 87 BPM | OXYGEN SATURATION: 95 % | HEIGHT: 74 IN | WEIGHT: 184.3 LBS | TEMPERATURE: 98.5 F | DIASTOLIC BLOOD PRESSURE: 65 MMHG | RESPIRATION RATE: 16 BRPM

## 2020-08-08 DIAGNOSIS — S80.212A KNEE ABRASION, LEFT, INITIAL ENCOUNTER: Primary | ICD-10-CM
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
## 2020-08-08 PROCEDURE — 99282 EMERGENCY DEPT VISIT SF MDM: CPT

## 2020-08-09 NOTE — ED NOTES
Patient given discharge papers and instructions. Patient verbalized understanding and stated not having any questions or concerns regarding his care. Patient ambulatory out of ED.

## 2020-08-09 NOTE — DISCHARGE INSTRUCTIONS
Patient Education        Scrapes (Abrasions): Care Instructions  Your Care Instructions  Scrapes (abrasions) are wounds where your skin has been rubbed or torn off. Most scrapes do not go deep into the skin, but some may remove several layers of skin. Scrapes usually don't bleed much, but they may ooze pinkish fluid. Scrapes on the head or face may appear worse than they are. They may bleed a lot because of the good blood supply to this area. Most scrapes heal well and may not need a bandage. They usually heal within 3 to 7 days. A large, deep scrape may take 1 to 2 weeks or longer to heal. A scab may form on some scrapes. Follow-up care is a key part of your treatment and safety. Be sure to make and go to all appointments, and call your doctor if you are having problems. It's also a good idea to know your test results and keep a list of the medicines you take. How can you care for yourself at home? · If your doctor told you how to care for your wound, follow your doctor's instructions. If you did not get instructions, follow this general advice:  ? Wash the scrape with clean water 2 times a day. Don't use hydrogen peroxide or alcohol, which can slow healing. ? You may cover the scrape with a thin layer of petroleum jelly, such as Vaseline, and a nonstick bandage. ? Apply more petroleum jelly and replace the bandage as needed. · Prop up the injured area on a pillow anytime you sit or lie down during the next 3 days. Try to keep it above the level of your heart. This will help reduce swelling. · Be safe with medicines. Take pain medicines exactly as directed. ? If the doctor gave you a prescription medicine for pain, take it as prescribed. ? If you are not taking a prescription pain medicine, ask your doctor if you can take an over-the-counter medicine. When should you call for help?    Call your doctor now or seek immediate medical care if:  · You have signs of infection, such as:  ? Increased pain, swelling, warmth, or redness around the scrape. ? Red streaks leading from the scrape. ? Pus draining from the scrape. ? A fever. · The scrape starts to bleed, and blood soaks through the bandage. Oozing small amounts of blood is normal.  Watch closely for changes in your health, and be sure to contact your doctor if the scrape is not getting better each day. Where can you learn more? Go to http://sotero-amos.info/  Enter A374 in the search box to learn more about \"Scrapes (Abrasions): Care Instructions. \"  Current as of: June 26, 2019               Content Version: 12.5  © 8239-2778 Healthwise, [employer]. Care instructions adapted under license by [employer] (which disclaims liability or warranty for this information). If you have questions about a medical condition or this instruction, always ask your healthcare professional. [address] any warranty or liability for your use of this information.
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
## 2020-11-09 ENCOUNTER — APPOINTMENT (OUTPATIENT)
Dept: FAMILY MEDICINE CLINIC | Age: 30
End: 2020-11-09

## 2020-11-11 ENCOUNTER — VIRTUAL VISIT (OUTPATIENT)
Dept: FAMILY MEDICINE CLINIC | Age: 30
End: 2020-11-11

## 2020-11-11 ENCOUNTER — OFFICE VISIT (OUTPATIENT)
Dept: URGENT CARE | Age: 30
End: 2020-11-11
Payer: COMMERCIAL

## 2020-11-11 VITALS — HEART RATE: 92 BPM | RESPIRATION RATE: 16 BRPM | OXYGEN SATURATION: 97 % | TEMPERATURE: 98.2 F

## 2020-11-11 DIAGNOSIS — B34.9 VIRAL ILLNESS: Primary | ICD-10-CM

## 2020-11-11 DIAGNOSIS — Z20.822 SUSPECTED COVID-19 VIRUS INFECTION: Primary | ICD-10-CM

## 2020-11-11 DIAGNOSIS — R07.0 THROAT PAIN: ICD-10-CM

## 2020-11-11 LAB
MONONUCLEOSIS SCREEN POC: NEGATIVE
S PYO AG THROAT QL: NEGATIVE
VALID INTERNAL CONTROL?: YES
VALID INTERNAL CONTROL?: YES

## 2020-11-11 PROCEDURE — 87880 STREP A ASSAY W/OPTIC: CPT | Performed by: FAMILY MEDICINE

## 2020-11-11 PROCEDURE — 99213 OFFICE O/P EST LOW 20 MIN: CPT | Performed by: NURSE PRACTITIONER

## 2020-11-11 PROCEDURE — 99203 OFFICE O/P NEW LOW 30 MIN: CPT | Performed by: FAMILY MEDICINE

## 2020-11-11 PROCEDURE — 86308 HETEROPHILE ANTIBODY SCREEN: CPT | Performed by: FAMILY MEDICINE

## 2020-11-11 NOTE — PROGRESS NOTES
Sore Throat    The history is provided by the patient. This is a new problem. The current episode started more than 1 week ago. There has been no fever. Associated symptoms include congestion, swollen glands and trouble swallowing. Pertinent negatives include no plugged ear sensation, no shortness of breath and no cough. He has had no exposure to strep or mono. He has tried nothing for the symptoms. Past Medical History:   Diagnosis Date    Back pain, chronic         Past Surgical History:   Procedure Laterality Date    HX HERNIA REPAIR  2012    HX OTHER SURGICAL      left forearm fracture         Family History   Problem Relation Age of Onset    Bipolar Disorder Mother     No Known Problems Father     Bipolar Disorder Sister     Bipolar Disorder Maternal Grandmother         Social History     Socioeconomic History    Marital status: SINGLE     Spouse name: Not on file    Number of children: Not on file    Years of education: Not on file    Highest education level: Not on file   Occupational History    Not on file   Social Needs    Financial resource strain: Not on file    Food insecurity     Worry: Not on file     Inability: Not on file    Transportation needs     Medical: Not on file     Non-medical: Not on file   Tobacco Use    Smoking status: Never Smoker    Smokeless tobacco: Never Used   Substance and Sexual Activity    Alcohol use:  Yes     Alcohol/week: 5.0 standard drinks     Types: 5 Cans of beer per week    Drug use: Never    Sexual activity: Never   Lifestyle    Physical activity     Days per week: Not on file     Minutes per session: Not on file    Stress: Not on file   Relationships    Social connections     Talks on phone: Not on file     Gets together: Not on file     Attends Spiritism service: Not on file     Active member of club or organization: Not on file     Attends meetings of clubs or organizations: Not on file     Relationship status: Not on file    Intimate partner violence     Fear of current or ex partner: Not on file     Emotionally abused: Not on file     Physically abused: Not on file     Forced sexual activity: Not on file   Other Topics Concern    Not on file   Social History Narrative    Not on file                ALLERGIES: Adhesive tape-silicones    Review of Systems   Constitutional: Positive for fatigue. Negative for fever. HENT: Positive for congestion, sore throat and trouble swallowing. Respiratory: Negative for cough and shortness of breath. All other systems reviewed and are negative. Vitals:    11/11/20 1458   Pulse: 92   Resp: 16   Temp: 98.2 °F (36.8 °C)   SpO2: 97%       Physical Exam  Vitals signs and nursing note reviewed. Constitutional:       General: He is not in acute distress. Appearance: He is not ill-appearing. HENT:      Nose: No congestion. Mouth/Throat:      Pharynx: No oropharyngeal exudate or posterior oropharyngeal erythema. Pulmonary:      Effort: Pulmonary effort is normal. No respiratory distress. Breath sounds: Normal breath sounds. Lymphadenopathy:      Cervical: No cervical adenopathy. MDM    Procedures        ICD-10-CM ICD-9-CM    1. Suspected COVID-19 virus infection  Z20.828 V01.79 NOVEL CORONAVIRUS (COVID-19)   2. Throat pain  R07.0 784.1 POC HETEROPHILE ANTIBODY SCREEN      AMB POC RAPID STREP A     No orders of the defined types were placed in this encounter. Results for orders placed or performed in visit on 11/11/20   POC HETEROPHILE ANTIBODY SCREEN   Result Value Ref Range    VALID INTERNAL CONTROL POC Yes     Mononucleosis screen (POC) Negative Negative   AMB POC RAPID STREP A   Result Value Ref Range    VALID INTERNAL CONTROL POC Yes     Group A Strep Ag Negative Negative     The patients condition was discussed with the patient and they understand. The patient is to follow up with primary care doctor. If signs and symptoms become worse the pt is to go to the ER.  The patient is to take medications as prescribed.

## 2020-11-11 NOTE — PROGRESS NOTES
[facility] Note  Subjective: Aleksey Dubon is a 31 y.o. male who presents for an acute visit with the following chief complaints. Chief Complaint   Patient presents with    Sore Throat     I was in the office while conducting this encounter. Consent:  He and/or his healthcare decision maker is aware that this patient-initiated Telehealth encounter is a billable service, with coverage as determined by his insurance carrier. He is aware that he may receive a bill and has provided verbal consent to proceed: Yes    This virtual visit was conducted via [employer]. Pursuant to the emergency declaration under the [facility], 1135 waiver authority and the [employer] and [employer] Act, this Virtual  Visit was conducted to reduce the patient's risk of exposure to COVID-19 and provide continuity of care for an established patient. Services were provided through a video synchronous discussion virtually to substitute for in-person clinic visit. Due to this being a TeleHealth evaluation, many elements of the physical examination are unable to be assessed. Total Time: minutes: 5-10 minutes. Here today with complaints of fatigue, sore throat and headache. Onset was 12 days ago, intermittent but persistent since onset. He reports his roommate started to exhibit symptoms of COVID in mid October, his roommate tested positive for COVID19 on 10/28/2020. The patient moved out of the apartment on this day. The patient then developed fatigue, sore throat and headache on 11/30/2020. He had negative rapid antigen testing on 11/31, then had PCR testing on 11/5 which was negative. Fatigue is described as generally feeling tired, was unable to work and was sent home due to fatigue 2 days ago. Sore throat is mild but persistent and headache is on and off also described as mild.   Mild nasal congestion but he does have a history of allergies and is unsure if this is related. Treatments: rest with relief. No previous evaluations. He has history of mono, worried about reactivation of mono and would like testing. Known COVID19 exposure 12 days ago - negative testing results as above. No known Strep exposure however, he works as . No recent travel. Current Outpatient Medications   Medication Sig Dispense Refill    triamcinolone (ARISTOCORT) 0.5 % topical cream Apply  to affected area two (2) times daily as needed for Skin Irritation or Itching. use thin layer (Patient not taking: Reported on 7/28/2020) 454 g 0     Allergies   Allergen Reactions    Adhesive Tape-Silicones Hives       ROS:   Complete review of systems was reviewed with pertinent information listed in HPI. Review of Systems   Constitutional: Positive for malaise/fatigue. Negative for chills, diaphoresis, fever and weight loss. HENT: Positive for congestion and sore throat. Negative for ear pain, hearing loss, sinus pain and tinnitus. Eyes: Negative for blurred vision and double vision. Respiratory: Negative for cough, hemoptysis, sputum production, shortness of breath and wheezing. Cardiovascular: Negative for chest pain, palpitations, orthopnea, claudication, leg swelling and PND. Gastrointestinal: Negative for abdominal pain, blood in stool, constipation, diarrhea, heartburn, melena, nausea and vomiting. Genitourinary: Negative for dysuria, frequency, hematuria and urgency. Musculoskeletal: Positive for myalgias. Negative for joint pain. Skin: Negative. Negative for itching and rash. Neurological: Positive for headaches. Negative for dizziness, tingling and weakness. Endo/Heme/Allergies: Positive for environmental allergies. Negative for polydipsia. Psychiatric/Behavioral: Negative. Objective: There were no vitals taken for this visit. Vitals and Nurse Documentation reviewed.      Physical Exam  Constitutional:       General: He is not in acute distress. Appearance: Normal appearance. He is well-developed, well-groomed and normal weight. He is not ill-appearing, toxic-appearing or diaphoretic. HENT:      Head: Normocephalic and atraumatic. Right Ear: Hearing normal.      Left Ear: Hearing normal.      Nose: No nasal deformity. Mouth/Throat:      Lips: Pink. No lesions. Mouth: Mucous membranes are moist.   Eyes:      General: Lids are normal.      Conjunctiva/sclera: Conjunctivae normal.   Pulmonary:      Effort: Pulmonary effort is normal.   Neurological:      Mental Status: He is alert and oriented to person, place, and time. Gait: Gait is intact. Psychiatric:         Attention and Perception: Attention normal.         Mood and Affect: Mood normal.         Speech: Speech normal.         Behavior: Behavior normal. Behavior is cooperative. Thought Content: Thought content normal.         Cognition and Memory: Cognition normal.         Judgment: Judgment normal.         Assessment/Plan:     Diagnoses and all orders for this visit:    1. Viral illness: 12 days of persistent generalized fatigue, sore throat and headache. Known COVID19 exposure about 14 days ago however, had negative PCR COVID19 testing 6 days ago. Exam is limited virtually however he is in no distress. Advised he seek urgent care for evaluation and strep testing, possible repeat COVID19 testing. Continue supportive measures, rest, increase fluids, NSAID's PRN for discomfort. Follow-up and Dispositions    · Return if symptoms worsen or fail to improve. Discussed expected course/resolution/complications of diagnosis in detail with patient.    Medication risks/benefits/costs/interactions/alternatives discussed with patient.    Pt was given an after visit summary which includes diagnoses, current medications & vitals.    Pt expressed understanding with the diagnosis and plan

## 2020-11-12 ENCOUNTER — VIRTUAL VISIT (OUTPATIENT)
Dept: FAMILY MEDICINE CLINIC | Age: 30
End: 2020-11-12
Payer: COMMERCIAL

## 2020-11-12 DIAGNOSIS — R53.83 FATIGUE, UNSPECIFIED TYPE: ICD-10-CM

## 2020-11-12 DIAGNOSIS — J02.9 SORE THROAT: Primary | ICD-10-CM

## 2020-11-12 PROCEDURE — 99213 OFFICE O/P EST LOW 20 MIN: CPT | Performed by: NURSE PRACTITIONER

## 2020-11-12 NOTE — PROGRESS NOTES
[facility] Note  Subjective: Andrew Cox is a 31 y.o. male who presents for an acute visit with the following chief complaints. Chief Complaint   Patient presents with    Follow-up     I was in the office while conducting this encounter. Consent:  He and/or his healthcare decision maker is aware that this patient-initiated Telehealth encounter is a billable service, with coverage as determined by his insurance carrier. He is aware that he may receive a bill and has provided verbal consent to proceed: Yes    This virtual visit was conducted via [employer]. Pursuant to the emergency declaration under the [facility], 1135 waiver authority and the [employer] Act, this Virtual  Visit was conducted to reduce the patient's risk of exposure to COVID-19 and provide continuity of care for an established patient. Services were provided through a video synchronous discussion virtually to substitute for in-person clinic visit. Due to this being a TeleHealth evaluation, many elements of the physical examination are unable to be assessed. Total Time: minutes: 5-10 minutes. Here today for follow-up of fatigue, sore throat, headache. Onset was 13 days ago, intermittent but persistent since onset. He reports his roommate started to exhibit symptoms of COVID in mid October, his roommate tested positive for COVID19 on 10/28/2020. The patient moved out of the apartment on this day. The patient then developed fatigue, sore throat and headache on 11/30/2020. He had negative rapid antigen testing on 11/31, then had PCR testing on 11/5 which was negative. Fatigue is described as generally feeling tired, was unable to work and was sent home due to fatigue 3 days ago. Sore throat is mild and intermittent but persistent and headache is on and off also described as mild.   Mild nasal congestion but he does have a history of allergies and is unsure if this is related. He also endorses history of reflux. Evaluated at urgent care yesterday; Strep and mono negative, provided suspected ongoing symptoms related to [address] and repeat COVID19 testing was collected and pending. Treatments: He started zyrtec yesterday for possible post nasal drainage and allergies. He also started Prilosec yesterday for possible reflux. Allergies   Allergen Reactions    Adhesive Tape-Silicones Hives       ROS:   Complete review of systems was reviewed with pertinent information listed in HPI. Review of Systems   Constitutional: Positive for malaise/fatigue. Negative for chills, diaphoresis, fever and weight loss. HENT: Positive for sore throat. Negative for congestion, ear pain, hearing loss, sinus pain and tinnitus. Eyes: Negative for blurred vision and double vision. Respiratory: Negative for shortness of breath. Cardiovascular: Negative for chest pain, palpitations and leg swelling. Gastrointestinal: Negative for abdominal pain, blood in stool, constipation, diarrhea, heartburn, melena, nausea and vomiting. Genitourinary: Negative for dysuria, frequency, hematuria and urgency. Musculoskeletal: Negative for joint pain and myalgias. Skin: Negative for itching and rash. Neurological: Negative for dizziness, tingling, weakness and headaches. Endo/Heme/Allergies: Negative for polydipsia. Psychiatric/Behavioral: Negative. Objective: There were no vitals taken for this visit. Vitals and Nurse Documentation reviewed. Physical Exam  Constitutional:       General: He is not in acute distress. Appearance: Normal appearance. He is well-developed, well-groomed and normal weight. He is not ill-appearing, toxic-appearing or diaphoretic. HENT:      Head: Normocephalic and atraumatic. Right Ear: Hearing normal.      Left Ear: Hearing normal.      Nose: No nasal deformity.       Mouth/Throat: Lips: Pink. No lesions. Mouth: Mucous membranes are moist.   Eyes:      General: Lids are normal.      Conjunctiva/sclera: Conjunctivae normal.   Pulmonary:      Effort: Pulmonary effort is normal.   Neurological:      Mental Status: He is alert and oriented to person, place, and time. Gait: Gait is intact. Psychiatric:         Attention and Perception: Attention normal.         Mood and Affect: Mood normal.         Speech: Speech normal.         Behavior: Behavior normal. Behavior is cooperative. Thought Content: Thought content normal.         Cognition and Memory: Cognition normal.         Judgment: Judgment normal.         Assessment/Plan:     Diagnoses and all orders for this visit:    13 days of persistent intermittent generalized fatigue, sore throat and headache. Known COVID19 exposure about 15 days ago however, had negative PCR COVID19 testing 7 days ago. Urgent care eval yesterday with negative strep and mono testing. Repeat COVID19 PCR testing is pending. Advised he continue antihistamine for possible allergic rhinitis and post nasal drainage, as well as PPI for possible reflux contributing to sore throat. We discussed that if his repeat COVID19 testing is negative, he is safe to return to work and safe to come into office if needed if symptoms persist.        1. Sore throat    2. Fatigue, unspecified type: Mil persis      Follow-up and Dispositions    · Return if symptoms worsen or fail to improve.          Discussed expected course/resolution/complications of diagnosis in detail with patient.    Medication risks/benefits/costs/interactions/alternatives discussed with patient.    Pt was given an after visit summary which includes diagnoses, current medications & vitals.  Pt expressed understanding with the diagnosis and plan

## 2020-11-13 LAB — SARS-COV-2, NAA: NOT DETECTED

## 2020-11-16 ENCOUNTER — TELEPHONE (OUTPATIENT)
Dept: FAMILY MEDICINE CLINIC | Age: 30
End: 2020-11-16

## 2020-11-16 NOTE — LETTER
NOTIFICATION RETURN TO WORK / SCHOOL    11/18/2020 9:40 AM    Mr. Sen Lara  [id number] [facility] [id number]      To Whom It May Concern:    Sen Lara is currently under the care of CHRISTINE Mariano. He will return to work/school on: 11/16/2020. If there are questions or concerns please have the patient contact our office.         Sincerely,      Francine Martino NP

## 2020-11-16 NOTE — TELEPHONE ENCOUNTER
Called, spoke to pt. Two pt identifiers confirmed. Patient informed writer that he was out of work from 11/05-11/13 and he went back to work on today 11/16/2020. Patient had a visit with you on 11/12/16. He is request a MD note for the days he was out of work. Please advise.

## 2020-11-18 NOTE — TELEPHONE ENCOUNTER
Letter provided. Please print and put up front and notify patient. He can also obtain on mychart. Thank you.

## 2020-11-18 NOTE — TELEPHONE ENCOUNTER
Called, spoke to pt. Two pt identifiers confirmed. Writer informed patient that letter is ready to be picked up.

## 2021-07-12 ENCOUNTER — OFFICE VISIT (OUTPATIENT)
Dept: FAMILY MEDICINE CLINIC | Age: 31
End: 2021-07-12
Payer: COMMERCIAL

## 2021-07-12 VITALS
WEIGHT: 186.2 LBS | HEIGHT: 72 IN | RESPIRATION RATE: 16 BRPM | HEART RATE: 90 BPM | OXYGEN SATURATION: 98 % | SYSTOLIC BLOOD PRESSURE: 120 MMHG | DIASTOLIC BLOOD PRESSURE: 72 MMHG | TEMPERATURE: 98 F | BODY MASS INDEX: 25.22 KG/M2

## 2021-07-12 DIAGNOSIS — M54.41 CHRONIC MIDLINE LOW BACK PAIN WITH BILATERAL SCIATICA: ICD-10-CM

## 2021-07-12 DIAGNOSIS — R20.2 NUMBNESS AND TINGLING IN BOTH HANDS: ICD-10-CM

## 2021-07-12 DIAGNOSIS — G89.29 CHRONIC MIDLINE LOW BACK PAIN WITH BILATERAL SCIATICA: ICD-10-CM

## 2021-07-12 DIAGNOSIS — M25.551 RIGHT HIP PAIN: Primary | ICD-10-CM

## 2021-07-12 DIAGNOSIS — R20.0 NUMBNESS AND TINGLING IN BOTH HANDS: ICD-10-CM

## 2021-07-12 DIAGNOSIS — M54.42 CHRONIC MIDLINE LOW BACK PAIN WITH BILATERAL SCIATICA: ICD-10-CM

## 2021-07-12 PROCEDURE — 99213 OFFICE O/P EST LOW 20 MIN: CPT | Performed by: FAMILY MEDICINE

## 2021-07-12 NOTE — PROGRESS NOTES
Sangeetha Hernandez  36 y.o. male  1990  [address] [place] [id number]  [id number]     CHRISTINE Cordon 55       Encounter Date: 7/12/2021           Established Patient Visit Note: Messi Bender MD    Reason for Appointment:  Chief Complaint   Patient presents with    Back Pain     having numbness in hands and feet    Hip Pain     Right       History of Present Illness:  History provided by patient    Sangeetha Hernandez is a 36 y.o. male who presents to clinic today for:    Back Pain and Hip Pain  Duraiton: 10 years, has been a little worse recently  Symptoms: \"general pain in the neck and lower back\", right hip pain (anterior)  Has sciatic pain of both lower legs and both hands  AS: denies any weakness of lower extremities, denies loss of sensation in lower extremities, denies any loss of bowel or bladder control  He reports that he has been working out a little more, and he has noticied worsening pain in the back and hip. He reports that when he exercises, it will worsen the back pain. He reports that he has done PT in the past, but it did not help. Not taking any medicines for the pain  He reports that he saw a sports medicine doctor for the right hip pain around 2014, who perfomed MRI but did not identify any soft tissue injury. Has hx of compression fracture at T9 d/t trampoline injury      Review of Systems  Review of Systems   Constitutional: Negative for chills and fever. Respiratory: Negative for cough, shortness of breath and wheezing. Cardiovascular: Negative for chest pain and palpitations. Allergies: Adhesive tape-silicones    Medications: (Updated to reflect final medication list after visit)  No current outpatient medications on file. History  Patient Care Team:  Janine Jaramillo NP as PCP - General (Nurse Practitioner)  Janine Jaramillo NP as PCP - [facility] Empaneled Provider    Past Medical History: he has a past medical history of Back pain, chronic.     Past Surgical History: he has a past surgical history that includes hx hernia repair (2012) and hx other surgical.    Family Medical History: family history includes Bipolar Disorder in his maternal grandmother, mother, and sister; No Known Problems in his father. Social History: he reports that he has never smoked. He has never used smokeless tobacco. He reports current alcohol use of about 5.0 standard drinks of alcohol per week. He reports that he does not use drugs. Health Maintenance Due   Topic Date Due    Hepatitis C Screening  Never done    DTaP/Tdap/Td series (2 - Tdap) 07/10/2008       Objective:   Visit Vitals  /72 (BP 1 Location: Left upper arm, BP Patient Position: Sitting, BP Cuff Size: Adult)   Pulse 90   Temp 98 °F (36.7 °C) (Oral)   Resp 16   Ht 6' (1.829 m)   Wt 186 lb 3.2 oz (84.5 kg)   SpO2 98%   BMI 25.25 kg/m²     Wt Readings from Last 3 Encounters:   07/12/21 186 lb 3.2 oz (84.5 kg)   08/08/20 184 lb 4.9 oz (83.6 kg)   04/21/20 178 lb (80.7 kg)       Physical Exam  Constitutional:       Appearance: Normal appearance. HENT:      Head: Normocephalic and atraumatic. Right Ear: External ear normal.      Left Ear: External ear normal.      Nose: Nose normal.      Mouth/Throat:      Pharynx: No oropharyngeal exudate. Eyes:      General: No scleral icterus. Right eye: No discharge. Left eye: No discharge. Conjunctiva/sclera: Conjunctivae normal.      Pupils: Pupils are equal, round, and reactive to light. Neck:      Thyroid: No thyromegaly. Vascular: No JVD. Trachea: No tracheal deviation. Cardiovascular:      Rate and Rhythm: Normal rate and regular rhythm. Heart sounds: Normal heart sounds. No murmur heard. No friction rub. No gallop. Pulmonary:      Effort: Pulmonary effort is normal. No respiratory distress. Breath sounds: Normal breath sounds. No stridor. No wheezing.    Musculoskeletal:         General: No tenderness or deformity. Normal range of motion. Cervical back: Normal range of motion and neck supple. Comments: Mild pain with internal rotion of both hips  Mild TTP of lower back   Lymphadenopathy:      Cervical: No cervical adenopathy. Skin:     General: Skin is warm and dry. Neurological:      Mental Status: He is alert. Cranial Nerves: No cranial nerve deficit. Coordination: Coordination normal.      Gait: Gait is intact. Deep Tendon Reflexes: Reflexes normal.             Assessment & Plan:      ICD-10-CM ICD-9-CM    1. Right hip pain  M25.551 719.45 REFERRAL TO PAIN MANAGEMENT   2. Chronic midline low back pain with bilateral sciatica  M54.41 724.2 REFERRAL TO PAIN MANAGEMENT    M54.42 724.3     G89.29 338.29    3. Numbness and tingling in both hands  R20.0 782.0 REFERRAL TO PAIN MANAGEMENT    R20.2       · Right Hip Pain, Back Pain, and Hand Pain: Chronic, uncontrolled. Referral to pain management as above. I have discussed the diagnosis with the patient and the intended plan as seen in the above orders. The patient has received an after-visit summary along with patient information handout. I have discussed medication side effects and warnings with the patient as well. Disposition  Follow-up and Dispositions    · Return if symptoms worsen or fail to improve.            Jeniffer Horton MD

## 2021-07-12 NOTE — PROGRESS NOTES
Chief Complaint   Patient presents with    Back Pain     having numbness in hands and feet    Hip Pain     Right     1. Have you been to the ER, urgent care clinic since your last visit? Hospitalized since your last visit? Yes he just needed covid test    2. Have you seen or consulted any other health care providers outside of the [address] [place] [address] since your last visit? Include any pap smears or colon screening.  No

## 2021-07-29 ENCOUNTER — TELEPHONE (OUTPATIENT)
Dept: FAMILY MEDICINE CLINIC | Age: 31
End: 2021-07-29

## 2021-07-29 NOTE — TELEPHONE ENCOUNTER
Ti Vicente (Self) [phone] (H)     Pt was prescribed Lyrica and wanted to know when he can drink alcohol while on the medication. Wants to know how long the medication stays in his system?

## 2021-07-30 NOTE — TELEPHONE ENCOUNTER
I do not see on the chart that we ordered Lyrica for you. So I cannot give recommendations on medications we have not ordered. Please contact the provider who ordered the medication.

## 2021-08-13 ENCOUNTER — OFFICE VISIT (OUTPATIENT)
Dept: NEUROLOGY | Age: 31
End: 2021-08-13
Payer: COMMERCIAL

## 2021-08-13 VITALS
WEIGHT: 188 LBS | OXYGEN SATURATION: 98 % | SYSTOLIC BLOOD PRESSURE: 122 MMHG | RESPIRATION RATE: 16 BRPM | HEIGHT: 74 IN | DIASTOLIC BLOOD PRESSURE: 74 MMHG | BODY MASS INDEX: 24.13 KG/M2 | HEART RATE: 72 BPM

## 2021-08-13 DIAGNOSIS — R20.2 PARESTHESIA OF BOTH FEET: ICD-10-CM

## 2021-08-13 DIAGNOSIS — R20.2 PARESTHESIA OF BOTH HANDS: Primary | ICD-10-CM

## 2021-08-13 PROCEDURE — 99204 OFFICE O/P NEW MOD 45 MIN: CPT | Performed by: PSYCHIATRY & NEUROLOGY

## 2021-08-13 RX ORDER — PREGABALIN 75 MG/1
CAPSULE ORAL
COMMUNITY
Start: 2021-07-20 | End: 2022-08-03 | Stop reason: ALTCHOICE

## 2021-08-13 NOTE — PROGRESS NOTES
Chief Complaint   Patient presents with   [facility] Patient     nerve pain bilateral hands and feet for 8 years, patient states has seen an ortho provider also saw DR Alvarez's NP for pain management recently, has never consulted with a neurologist       Visit Vitals  /74 (BP 1 Location: Left upper arm, BP Patient Position: Sitting)   Pulse 72   Resp 16   Ht 6' 1.75\" (1.873 m)   Wt 85.3 kg (188 lb)   SpO2 98%   BMI 24.30 kg/m²

## 2021-08-13 NOTE — PROGRESS NOTES
Aleksey Dubon (1990) is a 36 y.o. male, new patient, here for evaluation of the following     Chief complaint(s):   Chief Complaint   Patient presents with   [facility] Patient     nerve pain bilateral hands and feet for 8 years, patient states has seen an ortho provider also saw DR Alvarez's NP for pain management recently, has never consulted with a neurologist       HPI: 36 y.o. male     Pt reports that around 2012-13, started having tinging episodic pinching pains on bottoms of feet. Also having right hip pain/ joint pain. Saw Orthopedics, who referred to PT for hip pain, and had hip injection. Saw [facility], had arthrogram of hip, pt says it was normal. Recalls having MRI L-spine was normal per patient. Around 5 yrs ago, started to have pinching/ tingling paresthesias in palms of both hands, sometimes. The neuropathy-like pain in feet has not gone above ankles. Reports having a T6, Thoracic spine fracture in 2016, wore back brace x 2 months. No report of weakness in any extremities before or after the t-spine fracture. First time seeing neurologist.  Has nerve test scheduled with his Pain Management clinic. Eats a normal diet, not strict vegetarian  No hx of DM, no hx of thyroid illness  Denies constant, multi-focal joint pains  Does have intermittent knee pain related to exercise  No FHx of neuropathy  Hand paresthesias are not triggered by having hands or arms in certain positions    Had long, right forearm laceration into proximal wrist in 6th grade that required multiple sutures (deep and superficial). In middle school, broke left forearms had to have plates placed in left forearm. The plates were removed in 2011. Did not have hand paresthesias till about 5 yrs ago.         Review of Systems: joint pain     ==================================================    Allergies   Allergen Reactions    Adhesive Tape-Silicones Hives       Current Outpatient Medications   Medication Sig Dispense Refill    pregabalin (LYRICA) 75 mg capsule TAKE 1 PILL BEFORE BEDTIME FOR NEUROPATHIC PAIN         Past Medical History:   Diagnosis Date    Back pain, chronic        Past Surgical History:   Procedure Laterality Date    HX HERNIA REPAIR  2012    HX OTHER SURGICAL      left forearm fracture       family history includes Bipolar Disorder in his maternal grandmother, mother, and sister; No Known Problems in his father. reports that he has never smoked. He has never used smokeless tobacco. He reports current alcohol use of about 5.0 standard drinks of alcohol per week. He reports that he does not use drugs. PHYSICAL EXAM    Vitals:    08/13/21 0904   BP: 122/74   BP 1 Location: Left upper arm   BP Patient Position: Sitting   Pulse: 72   Resp: 16   Height: 6' 1.75\" (1.873 m)   Weight: 85.3 kg (188 lb)   SpO2: 98%       General: Head: atraumatic. Eyes: Conjunctivae and cornea clear. Vascular/ Carotid Arteries: not examined. Extremities: no edema. Skin: no rashes. Neurologic Exam:  Speech/ Language: normal.   Alertness: oriented person, place, situation. CNs: Smell: not tested. Visual Fields (II): full to confrontation. Pupils (II): equal, round, reactive to light. Funduscopic:  normal bilateral.  Extraocular movements (III, IV, VI): conjugate in all directions, no RONY. Ptosis (III, VII): none. Facial Sensation (V): intact to LT on both sides. Facial Movements (VII): symmetric at rest and on activation. Hearing (VIII): normal.  Soft palate elevation (IX): symmetric. Shoulder shrug (XI): symmetric, strong. Tongue protrusion (XII): midline      Motor:  5/5 in all exts. Sensory: intact LT, pinprick, temp, vibration in all exts. Cerebellar: no resting, postural, or intention tremors; normal FNF and Heel-shin bilateral .  Deep Tendon Reflexes:  symmetric, 1+.   Plantar response: neutral bilateral. Gait: normal.  Romberg: negative    ==================================================    ASSESSMENT/ PLAN: ICD-10-CM ICD-9-CM    1. Paresthesia of both hands  R20.2 782.0 EMG LIMITED      VITAMIN B12 & FOLATE      VITAMIN B12 & FOLATE      TSH 3RD GENERATION      METABOLIC PANEL, COMPREHENSIVE      CBC WITH AUTOMATED DIFF      TSH 3RD GENERATION      METABOLIC PANEL, COMPREHENSIVE      CBC WITH AUTOMATED DIFF   2. Paresthesia of both feet  R20.2 782.0 EMG LIMITED      VITAMIN B12 & FOLATE      VITAMIN B12 & FOLATE      TSH 3RD GENERATION      METABOLIC PANEL, COMPREHENSIVE      CBC WITH AUTOMATED DIFF      TSH 3RD GENERATION      METABOLIC PANEL, COMPREHENSIVE      CBC WITH AUTOMATED DIFF      + Tinel's at both elbows (paresthesias into 5th digit of both hands). Not exact/ typical hand paresthesias (tingling/ pinching in palms). Sensory-motor exam is normal so large fiber neuropathy is less likely. Will check EMG/ NCS one arm and leg (pt reports left side bothers more than right)    Check CMP, CBC, TSH, B12 (last CMP/ CBC was Jan 2020)    Follow up to go over results      An electronic signature was used to authenticate this note.   -- Star Carmona MD

## 2021-08-14 LAB
ALBUMIN SERPL-MCNC: 4.9 G/DL (ref 4.1–5.2)
ALBUMIN/GLOB SERPL: 2.3 {RATIO} (ref 1.2–2.2)
ALP SERPL-CCNC: 64 IU/L (ref 48–121)
ALT SERPL-CCNC: 19 IU/L (ref 0–44)
AST SERPL-CCNC: 17 IU/L (ref 0–40)
BASOPHILS # BLD AUTO: 0.1 X10E3/UL (ref 0–0.2)
BASOPHILS NFR BLD AUTO: 1 %
BILIRUB SERPL-MCNC: 0.8 MG/DL (ref 0–1.2)
BUN SERPL-MCNC: 10 MG/DL (ref 6–20)
BUN/CREAT SERPL: 13 (ref 9–20)
CALCIUM SERPL-MCNC: 9.7 MG/DL (ref 8.7–10.2)
CHLORIDE SERPL-SCNC: 102 MMOL/L (ref 96–106)
CO2 SERPL-SCNC: 26 MMOL/L (ref 20–29)
CREAT SERPL-MCNC: 0.77 MG/DL (ref 0.76–1.27)
EOSINOPHIL # BLD AUTO: 0.1 X10E3/UL (ref 0–0.4)
EOSINOPHIL NFR BLD AUTO: 2 %
ERYTHROCYTE [DISTWIDTH] IN BLOOD BY AUTOMATED COUNT: 11.8 % (ref 11.6–15.4)
FOLATE SERPL-MCNC: 10 NG/ML
GLOBULIN SER CALC-MCNC: 2.1 G/DL (ref 1.5–4.5)
GLUCOSE SERPL-MCNC: 86 MG/DL (ref 65–99)
HCT VFR BLD AUTO: 40.3 % (ref 37.5–51)
HGB BLD-MCNC: 14.1 G/DL (ref 13–17.7)
IMM GRANULOCYTES # BLD AUTO: 0 X10E3/UL (ref 0–0.1)
IMM GRANULOCYTES NFR BLD AUTO: 1 %
LYMPHOCYTES # BLD AUTO: 1.5 X10E3/UL (ref 0.7–3.1)
LYMPHOCYTES NFR BLD AUTO: 28 %
MCH RBC QN AUTO: 31.1 PG (ref 26.6–33)
MCHC RBC AUTO-ENTMCNC: 35 G/DL (ref 31.5–35.7)
MCV RBC AUTO: 89 FL (ref 79–97)
MONOCYTES # BLD AUTO: 0.5 X10E3/UL (ref 0.1–0.9)
MONOCYTES NFR BLD AUTO: 9 %
NEUTROPHILS # BLD AUTO: 3.1 X10E3/UL (ref 1.4–7)
NEUTROPHILS NFR BLD AUTO: 59 %
PLATELET # BLD AUTO: 218 X10E3/UL (ref 150–450)
POTASSIUM SERPL-SCNC: 4.1 MMOL/L (ref 3.5–5.2)
PROT SERPL-MCNC: 7 G/DL (ref 6–8.5)
RBC # BLD AUTO: 4.54 X10E6/UL (ref 4.14–5.8)
SODIUM SERPL-SCNC: 141 MMOL/L (ref 134–144)
TSH SERPL DL<=0.005 MIU/L-ACNC: 1.24 UIU/ML (ref 0.45–4.5)
VIT B12 SERPL-MCNC: 554 PG/ML (ref 232–1245)
WBC # BLD AUTO: 5.3 X10E3/UL (ref 3.4–10.8)

## 2021-09-20 ENCOUNTER — OFFICE VISIT (OUTPATIENT)
Dept: NEUROLOGY | Age: 31
End: 2021-09-20
Payer: COMMERCIAL

## 2021-09-20 DIAGNOSIS — R20.2 PARESTHESIA OF BOTH HANDS: Primary | ICD-10-CM

## 2021-09-20 DIAGNOSIS — R20.2 PARESTHESIA OF BOTH FEET: ICD-10-CM

## 2021-09-20 PROCEDURE — 95912 NRV CNDJ TEST 11-12 STUDIES: CPT | Performed by: PSYCHIATRY & NEUROLOGY

## 2021-09-20 PROCEDURE — 95886 MUSC TEST DONE W/N TEST COMP: CPT | Performed by: PSYCHIATRY & NEUROLOGY

## 2021-09-20 NOTE — PROCEDURES
EMG/ NCS Report  DR[facility] REH[facility]ATION  - [facility]  [facility], [address] [place], [address]   Ph: [phone]   FAX: [phone]/ [phone]    Test Date:  2021    Patient: Steffanie Mohs : 1990 Physician: Shailesh Lugo M.D. Sex: Male Height: ' \" Ref Phys: Shailesh Lugo M.D.   ID#: [id number] Weight:  lbs. Technician: Mony Willams     Patient History:    CC:Paresthesia    EMG & NCV Findings:  Evaluation of the left Fibular motor nerve showed normal distal onset latency (6.3 ms), reduced amplitude (2.0 mV), normal conduction velocity (B Fib-Ankle, 76 m/s), and normal conduction velocity (Poplt-B Fib, 50 m/s). The left median motor nerve showed normal distal onset latency (3.4 ms), normal amplitude (9.6 mV), and normal conduction velocity (Elbow-Wrist, 62 m/s). The left tibial motor nerve showed normal distal onset latency (3.7 ms), normal amplitude (11.9 mV), and normal conduction velocity (Knee-Ankle, 49 m/s). The left ulnar motor nerve showed normal distal onset latency (2.7 ms), normal amplitude (10.2 mV), normal conduction velocity (B Elbow-Wrist, 59 m/s), and normal conduction velocity (A Elbow-B Elbow, 71 m/s). The left median sensory, the left radial sensory, the left Sup Fibular sensory, the left sural sensory, and the left ulnar sensory nerves showed normal distal peak latency (L2.7, L1.9, L2.5, L3.2, L2.8 ms) and normal amplitude (L76.9, L74.3, L21.9, L13.4, L46.4 µV). The left median/ulnar (palm) comparison nerve showed normal distal onset latency (Median Palm, 1.2 ms), normal distal peak latency (Median Palm, 1.6 ms), normal amplitude (Median Palm, 142.7 µV), normal distal onset latency (Ulnar Palm, 0.9 ms), normal distal peak latency (Ulnar Palm, 1.7 ms), and normal amplitude (Ulnar Palm, 41.2 µV). All F Wave latencies were within normal limits.       Needle evaluation of the left extensor digitorum brevis muscle showed diminished recruitment and increased motor unit amplitude. All remaining muscles (as indicated in the following table) showed no evidence of electrical instability. Impression:    Electrodiagnostic study of the left upper extremity is normal.  There is no electrodiagnostic evidence of left median neuropathy (ie carpal tunnel), left ulnar neuropathy, or left cervical motor radiculopathy. Electrodiagnostic study of the left lower extremity is normal.  There is no electrodiagnostic evidence of left lumbar motor radiculopathy, left peroneal neuropathy, or left tibial neuropathy. There was no electrodiagnostic evidence of large fiber peripheral neuropathy. Correlate clinically.        ___________________________  Javier Vidal M.D.      Nerve Conduction Studies  Anti Sensory Summary Table     Stim Site NR Peak (ms) Norm Peak (ms) P-T Amp (µV) Norm P-T Amp Site1 Site2 Dist (cm)   Left Median Anti Sensory (2nd Digit)  31.9°C   Wrist    2.7 <4 76.9 >13 Wrist 2nd Digit 14.0   Left Radial Anti Sensory (Base 1st Digit)  32.2°C   Wrist    1.9 <2.8 74.3 >11 Wrist Base 1st Digit 10.0   Left Sup Fibular Anti Sensory (Lat ankle)  31.6°C   Lower leg    2.5 <4.4 21.9 >6 Lower leg Lat ankle 10.0   Site 2    2.5  16.8       Left Sural Anti Sensory (Lat Mall)  31.5°C   Calf    3.2 <4.5 13.4 >4.0 Calf Lat Mall 14.0   Site 2    3.2  11.4       Left Ulnar Anti Sensory (5th Digit)  32.1°C   Wrist    2.8 <4.0 46.4 >9 Wrist 5th Digit 14.0     Motor Summary Table     Stim Site NR Onset (ms) Norm Onset (ms) O-P Amp (mV) Norm O-P Amp Amp (Prev) (%) Site1 Site2 Dist (cm) Saurabh (m/s) Norm Saurabh (m/s)   Left Fibular Motor (Ext Dig Brev)  31.7°C   Ankle    6.3 <6.5 2.0 >2.6 100.0 Ankle Ext Dig Brev 8.0     B Fib    10.9  2.2  110.0 B Fib Ankle 35.0 76 >38   Poplt    12.9  2.0  90.9 Poplt B Fib 10.0 50 >38   Left Median Motor (Abd Poll Brev)  32.1°C   Wrist    3.4 <4.5 9.6 >4.1 100.0 Wrist Abd Poll Brev 8.0     Elbow    7.1  10.0  104.2 Elbow Wrist 23.0 62 >49   Left Tibial Motor (Abd Potter Brev)  31.7°C   Ankle    3.7 <6.1 11.9 >5.8 100.0 Ankle Abd Potter Brev 8.0     Knee    11.4  10.1  84.9 Knee Ankle 38.0 49 >44   Left Ulnar Motor (Abd Dig Minimi)  31.6°C   Wrist    2.7 <3.7 10.2 >7.9 100.0 Wrist Abd Dig Minimi 8.0     B Elbow    6.6  10.5  102.9 B Elbow Wrist 23.0 59 >52   A Elbow    8.0  10.3  98.1 A Elbow B Elbow 10.0 71 >43     Comparison Summary Table     Stim Site NR Peak (ms) P-T Amp (µV) Site1 Site2 Dist (cm) Delta-0 (ms)   Left Median/Ulnar Palm Comparison (Wrist)  32°C   Median Palm    1.6 202.2 Median Palm Ulnar Palm 8.0 0.3   Ulnar Palm    1.7 43.7         F Wave Studies     NR F-Lat (ms) Lat Norm (ms) L-R F-Lat (ms) L-R Lat Norm   Left Tibial (Mrkrs) (Abd Hallucis)  31.5°C      40.00 <56  <5.7   Left Ulnar (Mrkrs) (Abd Dig Min)  31.8°C      26.98 <32  <2.5     H Reflex Studies     NR H-Lat (ms) L-R H-Lat (ms) L-R Lat Norm   Left Tibial (Gastroc)  31.5°C      30.82  <2.0     EMG     Side Muscle Nerve Root Ins Act Fibs Psw Recrt Duration Amp Poly Comment   Left Abd Poll Brev Median C8-T1 Nml Nml Nml Nml Nml Nml Nml    Left 1stDorInt Ulnar C8-T1 Nml Nml Nml Nml Nml Nml Nml    Left ExtIndicis Radial (Post Int) C7-8 Nml Nml Nml Nml Nml Nml Nml    Left PronatorTeres Median C6-7 Nml Nml Nml Nml Nml Nml Nml    Left Biceps Musculocut C5-6 Nml Nml Nml Nml Nml Nml Nml    Left Triceps Radial C6-7-8 Nml Nml Nml Nml Nml Nml Nml    Left Deltoid Axillary C5-6 Nml Nml Nml Nml Nml Nml Nml    Left Mid Cerv Parasp Rami C5,6 Nml Nml Nml Nml Nml Nml Nml    Left Lower Cerv Parasp Rami C7,T1 Nml Nml Nml Nml Nml Nml Nml    Left Ext Dig Brev Dp Br Peron L5, S1 Nml Nml Nml Reduced Nml Incr Nml    Left AntTibialis Dp Br Peron L4-5 Nml Nml Nml Nml Nml Nml Nml    Left MedGastroc Tibial S1-2 Nml Nml Nml Nml Nml Nml Nml    Left VastusMed Femoral L2-4 Nml Nml Nml Nml Nml Nml Nml    Left GluteusMax InfGluteal L5-S2 Nml Nml Nml Nml Nml Nml Nml    Left GluteusMed SupGluteal L4-S1 Nml Nml Nml Nml Nml Nml Nml    Left Lower Lumb Parasp Rami L5,S1 Nml Nml Nml Nml Nml Nml Nml      Waveforms:

## 2021-09-24 ENCOUNTER — OFFICE VISIT (OUTPATIENT)
Dept: NEUROLOGY | Age: 31
End: 2021-09-24
Payer: COMMERCIAL

## 2021-09-24 VITALS
BODY MASS INDEX: 23.74 KG/M2 | OXYGEN SATURATION: 99 % | HEART RATE: 90 BPM | HEIGHT: 74 IN | RESPIRATION RATE: 16 BRPM | WEIGHT: 185 LBS | SYSTOLIC BLOOD PRESSURE: 122 MMHG | DIASTOLIC BLOOD PRESSURE: 70 MMHG

## 2021-09-24 DIAGNOSIS — R20.2 PARESTHESIA OF BOTH HANDS: Primary | ICD-10-CM

## 2021-09-24 DIAGNOSIS — R20.2 PARESTHESIA OF BOTH FEET: ICD-10-CM

## 2021-09-24 PROCEDURE — 99214 OFFICE O/P EST MOD 30 MIN: CPT | Performed by: PSYCHIATRY & NEUROLOGY

## 2021-09-24 NOTE — PATIENT INSTRUCTIONS
For intermittent tingling of feet (particular bottoms of feet): consider seeing Podiatrist to ask about tarsal tunnel syndrome    For intermittent tingling in hands / feet: consider doing a skin biopsy to eval for possible small fiber neuropathy. As we discussed these symptoms are not typical of Multiple Sclerosis, but if no other cause of the tingling is found, we can check a Brain MRI to evaluate for that (rule that out).      Schedule follow up visit if needed

## 2021-09-24 NOTE — PROGRESS NOTES
Elijah Gutierrez (1990) is a 36 y.o. male, established patient, here for evaluation of the following     Chief complaint(s):   Chief Complaint   Patient presents with    Follow-up      EMG results        SUBJECTIVE/ OBJECTIVE:    HPI: 36 y.o. male      EMG 9-: Left upper and lower extremity EMG/ nerve conduction study was normal.  There is no evidence of left cervical or lumbar motor radiculopathy, large fiber polyneuropathy, or other electrodiagnostic abnormality. Labs: 8-13-21: TSH 1.240, CMP normal with minimally elevated a-G ratio 2.3, CBC normal, vitamin B12 554. Patient continues to report intermittent paresthesias in the bottoms of feet or in hands. He notes that these paresthesias seemed to start when he began cycling/ biking intensely (2 hrs per   session). Exam today showed:    Plantar aspect right foot: normal LT medial and lateral surface  Plantar aspect of left foot: normal LT medial surface; reduced LT lateral surface  Negative Tinel's behind right medial malleolus  Positive Tinel's behind left medial malleolus       Review of Systems: as above    ========================================    Brief Hx:     Initial Visit: 8- (see for full details)      Allergies   Allergen Reactions    Adhesive Tape-Silicones Hives         Current Outpatient Medications:     pregabalin (LYRICA) 75 mg capsule, TAKE 1 PILL BEFORE BEDTIME FOR NEUROPATHIC PAIN (Patient not taking: Reported on 9/24/2021), Disp: , Rfl:      has a past medical history of Back pain, chronic.     has a past surgical history that includes hx hernia repair (2012) and hx other surgical.      Physical Exam:    Vitals:    09/24/21 0802   BP: 122/70   BP 1 Location: Left upper arm   BP Patient Position: Sitting   Pulse: 90   Resp: 16   Height: 6' 2\" (1.88 m)   Weight: 83.9 kg (185 lb)   SpO2: 99%     See above    ========================================    ASSESSMENT/ PLAN:       ICD-10-CM ICD-9-CM    1.  Paresthesia of both hands  R20.2 782.0    2. Paresthesia of both feet  R20.2 782.0         D/w patient that the hx of his feet symptoms beginning after prolonged biking and his exam today suggests that at least the left foot / plantar symptoms may be due to tarsal tunnel syndrome. D/w him that the biking hx doesn't account for his hand symptoms, particularly since he gets the paresthesias even when not exercising. Discussed the possibility of small fiber neuropathy and how that differs from large fiber neuropathy (which wasn't seen on his EMG / NCS test). Discussed other diagnostic/ treatment options: skin biopsy to eval for small fiber neuropathy and/ or patient seeing podiatrist and/or if neither of those are revealing then MRI Brain with and without contrast to eval for any underlying structural abnormalities (ie any white matter lesions), though these paresthesias are not typical of demyelinating disease (ie MS). He expressed understanding and will schedule neurology follow up if he feels he wants to purse the skin biopsy or the MRI. An electronic signature was used to authenticate this note.   -- Maria D Song MD

## 2021-09-24 NOTE — PROGRESS NOTES
Chief Complaint   Patient presents with    Follow-up      EMG results      Visit Vitals  /70 (BP 1 Location: Left upper arm, BP Patient Position: Sitting)   Pulse 90   Resp 16   Ht 6' 2\" (1.88 m)   Wt 83.9 kg (185 lb)   SpO2 99%   BMI 23.75 kg/m²

## 2022-08-03 ENCOUNTER — OFFICE VISIT (OUTPATIENT)
Dept: FAMILY MEDICINE CLINIC | Age: 32
End: 2022-08-03
Payer: COMMERCIAL

## 2022-08-03 VITALS
TEMPERATURE: 97.7 F | OXYGEN SATURATION: 97 % | HEART RATE: 91 BPM | WEIGHT: 175 LBS | BODY MASS INDEX: 22.46 KG/M2 | HEIGHT: 74 IN | DIASTOLIC BLOOD PRESSURE: 70 MMHG | SYSTOLIC BLOOD PRESSURE: 110 MMHG

## 2022-08-03 DIAGNOSIS — R10.84 GENERALIZED ABDOMINAL PAIN: Primary | ICD-10-CM

## 2022-08-03 DIAGNOSIS — R10.31 RIGHT GROIN PAIN: ICD-10-CM

## 2022-08-03 PROCEDURE — 99213 OFFICE O/P EST LOW 20 MIN: CPT | Performed by: NURSE PRACTITIONER

## 2022-08-03 NOTE — PROGRESS NOTES
Assessment/Plan:     Diagnoses and all orders for this visit:    1. Generalized abdominal pain  -     CT ABD PELV WO CONT; Future    2. Right groin pain  -     CT ABD PELV WO CONT; Future       etiology unclear. CT pending. Follow up will be based on results. Discussed expected course/resolution/complications of diagnosis in detail with patient. Medication risks/benefits/costs/interactions/alternatives discussed with patient. Pt was given after visit summary which includes diagnoses, current medications & vitals. Pt expressed understanding with the diagnosis and plan          Subjective: Andrea Kate is a 29 y.o. male who presents for had concerns including Groin Pain (Symptoms for one month , hernia in 2013 . Right side of groin radiating to upper abdomen right side . Pain is severe with activity and carrying stressor in life. Pain has been at a 7 ) and Muscle Pain (Right side of abdomen ). Reports a several month history of right abdominal pain in the right lower groin. Symptoms are waxing and waning. This is his first evaluation. Symptoms are exacerbated with activity and stress. History of right inguinal hernia repair in 2013. There is no problem list on file for this patient. REM      Allergies   Allergen Reactions    Adhesive Tape-Silicones Hives       ROS:   Review of Systems   Constitutional:  Negative for malaise/fatigue. Eyes:  Negative for blurred vision. Respiratory:  Negative for shortness of breath. Cardiovascular:  Negative for chest pain. Gastrointestinal:  Positive for abdominal pain. Objective:   Visit Vitals  /70 (BP 1 Location: Left arm, BP Patient Position: Sitting, BP Cuff Size: Adult)   Pulse 91   Temp 97.7 °F (36.5 °C) (Temporal)   Ht 6' 2\" (1.88 m)   Wt 175 lb (79.4 kg)   SpO2 97%   BMI 22.47 kg/m²       Vitals and Nurse Documentation reviewed.      Physical Exam  Constitutional:       General: He is not in acute distress. Cardiovascular:      Heart sounds: S1 normal and S2 normal. No murmur heard. No friction rub. No gallop. Pulmonary:      Effort: No respiratory distress. Breath sounds: Normal breath sounds. Abdominal:      General: Abdomen is flat. Bowel sounds are normal.      Palpations: Abdomen is soft. Tenderness: There is no abdominal tenderness. Skin:     General: Skin is warm and dry.    Psychiatric:         Mood and Affect: Mood and affect normal. - - -

## 2022-08-03 NOTE — PROGRESS NOTES
Chief Complaint   Patient presents with    Groin Pain     Symptoms for one month , hernia in 2013 . Right side of groin radiating to upper abdomen right side . Pain is severe with activity and carrying stressor in life. Pain has been at a 7     Muscle Pain     Right side of abdomen      1. Have you been to the ER, urgent care clinic since your last visit? Hospitalized since your last visit? No    2. Have you seen or consulted any other health care providers outside of the [address] [place] [address] since your last visit? Include any pap smears or colon screening.  No

## 2022-08-09 ENCOUNTER — HOSPITAL ENCOUNTER (OUTPATIENT)
Dept: CT IMAGING | Age: 32
Discharge: HOME | End: 2022-08-09
Attending: NURSE PRACTITIONER
Payer: COMMERCIAL

## 2022-08-09 DIAGNOSIS — R10.31 RIGHT GROIN PAIN: ICD-10-CM

## 2022-08-09 DIAGNOSIS — R10.84 GENERALIZED ABDOMINAL PAIN: ICD-10-CM

## 2022-08-09 PROCEDURE — 74176 CT ABD & PELVIS W/O CONTRAST: CPT

## 2022-08-25 ENCOUNTER — TELEPHONE (OUTPATIENT)
Dept: FAMILY MEDICINE CLINIC | Age: 32
End: 2022-08-25

## 2022-08-25 NOTE — TELEPHONE ENCOUNTER
Call and left voicemail for pt to return call to office, pt needed to make an appointment for referral.

## 2022-08-25 NOTE — TELEPHONE ENCOUNTER
----- Message from [facility] sent at 8/24/2022  4:07 PM EDT -----  Subject: Referral Request    Reason for referral request? Manjit Christensen would like a referral for an   Orthopedist, he has been having bilateral knee pain for 5 years and would   like to see an orthopedist for his issues. He can be reached at   [phone] ok to leave a message  Provider patient wants to be referred to(if known):     Provider Phone Number(if known): Additional Information for Provider? He does not have an Orthopedist in   mind and would like to get help with his knees.   ---------------------------------------------------------------------------  --------------  Watson RODRIGES    [phone]; OK to leave message on voicemail  ---------------------------------------------------------------------------  --------------